# Patient Record
Sex: FEMALE | Race: WHITE | NOT HISPANIC OR LATINO | Employment: FULL TIME | URBAN - METROPOLITAN AREA
[De-identification: names, ages, dates, MRNs, and addresses within clinical notes are randomized per-mention and may not be internally consistent; named-entity substitution may affect disease eponyms.]

---

## 2020-10-13 ENCOUNTER — TRANSCRIBE ORDERS (OUTPATIENT)
Dept: ADMINISTRATIVE | Facility: HOSPITAL | Age: 60
End: 2020-10-13

## 2020-10-13 DIAGNOSIS — S83.92XA SPRAIN OF LEFT KNEE, INITIAL ENCOUNTER: Primary | ICD-10-CM

## 2020-10-23 ENCOUNTER — HOSPITAL ENCOUNTER (OUTPATIENT)
Dept: MRI IMAGING | Facility: HOSPITAL | Age: 60
Discharge: HOME | End: 2020-10-23
Payer: OTHER MISCELLANEOUS

## 2020-10-23 DIAGNOSIS — S83.92XA SPRAIN OF LEFT KNEE, INITIAL ENCOUNTER: ICD-10-CM

## 2020-10-23 PROCEDURE — G1004 CDSM NDSC: HCPCS

## 2020-10-23 PROCEDURE — 73721 MRI JNT OF LWR EXTRE W/O DYE: CPT

## 2023-06-24 ENCOUNTER — HOSPITAL ENCOUNTER (INPATIENT)
Facility: HOSPITAL | Age: 63
LOS: 5 days | Discharge: HOME HEALTH | DRG: 603 | End: 2023-06-29
Attending: EMERGENCY MEDICINE | Admitting: HOSPITALIST
Payer: COMMERCIAL

## 2023-06-24 ENCOUNTER — APPOINTMENT (EMERGENCY)
Dept: CT IMAGING | Facility: HOSPITAL | Age: 63
DRG: 603 | End: 2023-06-24
Payer: COMMERCIAL

## 2023-06-24 DIAGNOSIS — L02.31 ABSCESS OF BUTTOCK, RIGHT: ICD-10-CM

## 2023-06-24 DIAGNOSIS — L03.317 CELLULITIS OF RIGHT BUTTOCK: Primary | ICD-10-CM

## 2023-06-24 DIAGNOSIS — R59.1 LYMPHADENOPATHY: ICD-10-CM

## 2023-06-24 PROBLEM — I10 HTN (HYPERTENSION): Status: ACTIVE | Noted: 2023-06-24

## 2023-06-24 PROBLEM — E66.9 OBESITY: Status: ACTIVE | Noted: 2023-06-24

## 2023-06-24 PROBLEM — E78.5 HLD (HYPERLIPIDEMIA): Status: ACTIVE | Noted: 2023-06-24

## 2023-06-24 LAB
ALBUMIN SERPL BCP-MCNC: 3.7 G/DL (ref 3.5–5)
ALP SERPL-CCNC: 48 U/L (ref 34–104)
ALT SERPL W P-5'-P-CCNC: 11 U/L (ref 7–52)
ANION GAP SERPL CALCULATED.3IONS-SCNC: 5 MMOL/L
AST SERPL W P-5'-P-CCNC: 15 U/L (ref 13–39)
BASOPHILS # BLD AUTO: 0.04 THOUSANDS/ÂΜL (ref 0–0.1)
BASOPHILS NFR BLD AUTO: 0 % (ref 0–1)
BILIRUB SERPL-MCNC: 0.52 MG/DL (ref 0.2–1)
BUN SERPL-MCNC: 10 MG/DL (ref 5–25)
CALCIUM SERPL-MCNC: 8.9 MG/DL (ref 8.4–10.2)
CHLORIDE SERPL-SCNC: 95 MMOL/L (ref 96–108)
CO2 SERPL-SCNC: 38 MMOL/L (ref 21–32)
CREAT SERPL-MCNC: 0.81 MG/DL (ref 0.6–1.3)
EOSINOPHIL # BLD AUTO: 0.02 THOUSAND/ÂΜL (ref 0–0.61)
EOSINOPHIL NFR BLD AUTO: 0 % (ref 0–6)
ERYTHROCYTE [DISTWIDTH] IN BLOOD BY AUTOMATED COUNT: 13.4 % (ref 11.6–15.1)
GFR SERPL CREATININE-BSD FRML MDRD: 76 ML/MIN/1.73SQ M
GLUCOSE SERPL-MCNC: 103 MG/DL (ref 65–140)
HCT VFR BLD AUTO: 35.9 % (ref 34.8–46.1)
HGB BLD-MCNC: 11.6 G/DL (ref 11.5–15.4)
IMM GRANULOCYTES # BLD AUTO: 0.05 THOUSAND/UL (ref 0–0.2)
IMM GRANULOCYTES NFR BLD AUTO: 0 % (ref 0–2)
LACTATE SERPL-SCNC: 0.7 MMOL/L (ref 0.5–2)
LYMPHOCYTES # BLD AUTO: 1.89 THOUSANDS/ÂΜL (ref 0.6–4.47)
LYMPHOCYTES NFR BLD AUTO: 13 % (ref 14–44)
MAGNESIUM SERPL-MCNC: 2.2 MG/DL (ref 1.9–2.7)
MCH RBC QN AUTO: 29.3 PG (ref 26.8–34.3)
MCHC RBC AUTO-ENTMCNC: 32.3 G/DL (ref 31.4–37.4)
MCV RBC AUTO: 91 FL (ref 82–98)
MONOCYTES # BLD AUTO: 1.6 THOUSAND/ÂΜL (ref 0.17–1.22)
MONOCYTES NFR BLD AUTO: 11 % (ref 4–12)
NEUTROPHILS # BLD AUTO: 10.86 THOUSANDS/ÂΜL (ref 1.85–7.62)
NEUTS SEG NFR BLD AUTO: 76 % (ref 43–75)
NRBC BLD AUTO-RTO: 0 /100 WBCS
PLATELET # BLD AUTO: 207 THOUSANDS/UL (ref 149–390)
PMV BLD AUTO: 10.2 FL (ref 8.9–12.7)
POTASSIUM SERPL-SCNC: 2.9 MMOL/L (ref 3.5–5.3)
PROCALCITONIN SERPL-MCNC: <0.05 NG/ML
PROT SERPL-MCNC: 7.1 G/DL (ref 6.4–8.4)
RBC # BLD AUTO: 3.96 MILLION/UL (ref 3.81–5.12)
SODIUM SERPL-SCNC: 138 MMOL/L (ref 135–147)
WBC # BLD AUTO: 14.46 THOUSAND/UL (ref 4.31–10.16)

## 2023-06-24 PROCEDURE — 96365 THER/PROPH/DIAG IV INF INIT: CPT

## 2023-06-24 PROCEDURE — 99284 EMERGENCY DEPT VISIT MOD MDM: CPT

## 2023-06-24 PROCEDURE — G1004 CDSM NDSC: HCPCS

## 2023-06-24 PROCEDURE — 87147 CULTURE TYPE IMMUNOLOGIC: CPT | Performed by: PHYSICIAN ASSISTANT

## 2023-06-24 PROCEDURE — 84145 PROCALCITONIN (PCT): CPT | Performed by: PHYSICIAN ASSISTANT

## 2023-06-24 PROCEDURE — 96367 TX/PROPH/DG ADDL SEQ IV INF: CPT

## 2023-06-24 PROCEDURE — 96375 TX/PRO/DX INJ NEW DRUG ADDON: CPT

## 2023-06-24 PROCEDURE — 85025 COMPLETE CBC W/AUTO DIFF WBC: CPT | Performed by: PHYSICIAN ASSISTANT

## 2023-06-24 PROCEDURE — 87186 SC STD MICRODIL/AGAR DIL: CPT | Performed by: PHYSICIAN ASSISTANT

## 2023-06-24 PROCEDURE — 96361 HYDRATE IV INFUSION ADD-ON: CPT

## 2023-06-24 PROCEDURE — 80053 COMPREHEN METABOLIC PANEL: CPT | Performed by: PHYSICIAN ASSISTANT

## 2023-06-24 PROCEDURE — 93005 ELECTROCARDIOGRAM TRACING: CPT

## 2023-06-24 PROCEDURE — 72193 CT PELVIS W/DYE: CPT

## 2023-06-24 PROCEDURE — 87205 SMEAR GRAM STAIN: CPT | Performed by: PHYSICIAN ASSISTANT

## 2023-06-24 PROCEDURE — 87070 CULTURE OTHR SPECIMN AEROBIC: CPT | Performed by: PHYSICIAN ASSISTANT

## 2023-06-24 PROCEDURE — 96368 THER/DIAG CONCURRENT INF: CPT

## 2023-06-24 PROCEDURE — 87040 BLOOD CULTURE FOR BACTERIA: CPT | Performed by: PHYSICIAN ASSISTANT

## 2023-06-24 PROCEDURE — 83605 ASSAY OF LACTIC ACID: CPT | Performed by: PHYSICIAN ASSISTANT

## 2023-06-24 PROCEDURE — 83735 ASSAY OF MAGNESIUM: CPT | Performed by: PHYSICIAN ASSISTANT

## 2023-06-24 PROCEDURE — 99285 EMERGENCY DEPT VISIT HI MDM: CPT | Performed by: PHYSICIAN ASSISTANT

## 2023-06-24 PROCEDURE — 36415 COLL VENOUS BLD VENIPUNCTURE: CPT | Performed by: PHYSICIAN ASSISTANT

## 2023-06-24 PROCEDURE — 99255 IP/OBS CONSLTJ NEW/EST HI 80: CPT | Performed by: PHYSICIAN ASSISTANT

## 2023-06-24 PROCEDURE — 99222 1ST HOSP IP/OBS MODERATE 55: CPT | Performed by: HOSPITALIST

## 2023-06-24 RX ORDER — HYDROCHLOROTHIAZIDE 12.5 MG/1
25 CAPSULE, GELATIN COATED ORAL DAILY
COMMUNITY

## 2023-06-24 RX ORDER — HYDROCHLOROTHIAZIDE 12.5 MG/1
12.5 TABLET ORAL DAILY
Status: DISCONTINUED | OUTPATIENT
Start: 2023-06-25 | End: 2023-06-29 | Stop reason: HOSPADM

## 2023-06-24 RX ORDER — PRAVASTATIN SODIUM 20 MG
20 TABLET ORAL
Status: DISCONTINUED | OUTPATIENT
Start: 2023-06-24 | End: 2023-06-29 | Stop reason: HOSPADM

## 2023-06-24 RX ORDER — SERTRALINE HYDROCHLORIDE 25 MG/1
150 TABLET, FILM COATED ORAL DAILY
COMMUNITY

## 2023-06-24 RX ORDER — ENOXAPARIN SODIUM 100 MG/ML
40 INJECTION SUBCUTANEOUS DAILY
Status: DISCONTINUED | OUTPATIENT
Start: 2023-06-25 | End: 2023-06-29 | Stop reason: HOSPADM

## 2023-06-24 RX ORDER — ONDANSETRON 2 MG/ML
4 INJECTION INTRAMUSCULAR; INTRAVENOUS EVERY 6 HOURS PRN
Status: DISCONTINUED | OUTPATIENT
Start: 2023-06-24 | End: 2023-06-29 | Stop reason: HOSPADM

## 2023-06-24 RX ORDER — CEFEPIME HYDROCHLORIDE 2 G/50ML
2000 INJECTION, SOLUTION INTRAVENOUS EVERY 12 HOURS
Status: DISCONTINUED | OUTPATIENT
Start: 2023-06-25 | End: 2023-06-28

## 2023-06-24 RX ORDER — KETOROLAC TROMETHAMINE 30 MG/ML
15 INJECTION, SOLUTION INTRAMUSCULAR; INTRAVENOUS EVERY 6 HOURS PRN
Status: DISCONTINUED | OUTPATIENT
Start: 2023-06-24 | End: 2023-06-27

## 2023-06-24 RX ORDER — ACETAMINOPHEN 325 MG/1
650 TABLET ORAL EVERY 6 HOURS PRN
Status: DISCONTINUED | OUTPATIENT
Start: 2023-06-24 | End: 2023-06-29 | Stop reason: HOSPADM

## 2023-06-24 RX ORDER — POTASSIUM CHLORIDE 20 MEQ/1
40 TABLET, EXTENDED RELEASE ORAL ONCE
Status: COMPLETED | OUTPATIENT
Start: 2023-06-24 | End: 2023-06-24

## 2023-06-24 RX ORDER — SENNOSIDES 8.6 MG
1 TABLET ORAL DAILY
Status: DISCONTINUED | OUTPATIENT
Start: 2023-06-25 | End: 2023-06-29 | Stop reason: HOSPADM

## 2023-06-24 RX ORDER — ASPIRIN 81 MG/1
81 TABLET, CHEWABLE ORAL DAILY
Status: DISCONTINUED | OUTPATIENT
Start: 2023-06-25 | End: 2023-06-29 | Stop reason: HOSPADM

## 2023-06-24 RX ORDER — MAGNESIUM SULFATE 1 G/100ML
1 INJECTION INTRAVENOUS ONCE
Status: COMPLETED | OUTPATIENT
Start: 2023-06-24 | End: 2023-06-24

## 2023-06-24 RX ORDER — SIMVASTATIN 20 MG
20 TABLET ORAL
COMMUNITY

## 2023-06-24 RX ORDER — POTASSIUM CHLORIDE 14.9 MG/ML
20 INJECTION INTRAVENOUS ONCE
Status: COMPLETED | OUTPATIENT
Start: 2023-06-24 | End: 2023-06-24

## 2023-06-24 RX ORDER — DOCUSATE SODIUM 100 MG/1
100 CAPSULE, LIQUID FILLED ORAL 2 TIMES DAILY
Status: DISCONTINUED | OUTPATIENT
Start: 2023-06-24 | End: 2023-06-29 | Stop reason: HOSPADM

## 2023-06-24 RX ORDER — KETOROLAC TROMETHAMINE 30 MG/ML
15 INJECTION, SOLUTION INTRAMUSCULAR; INTRAVENOUS ONCE
Status: COMPLETED | OUTPATIENT
Start: 2023-06-24 | End: 2023-06-24

## 2023-06-24 RX ORDER — POLYETHYLENE GLYCOL 3350 17 G/17G
17 POWDER, FOR SOLUTION ORAL DAILY
Status: DISCONTINUED | OUTPATIENT
Start: 2023-06-25 | End: 2023-06-29 | Stop reason: HOSPADM

## 2023-06-24 RX ORDER — ASPIRIN 81 MG/1
81 TABLET, CHEWABLE ORAL DAILY
COMMUNITY

## 2023-06-24 RX ORDER — CEFEPIME HYDROCHLORIDE 2 G/50ML
2000 INJECTION, SOLUTION INTRAVENOUS ONCE
Status: COMPLETED | OUTPATIENT
Start: 2023-06-24 | End: 2023-06-24

## 2023-06-24 RX ADMIN — MAGNESIUM SULFATE IN DEXTROSE 1 G: 10 INJECTION, SOLUTION INTRAVENOUS at 13:24

## 2023-06-24 RX ADMIN — SODIUM CHLORIDE 1000 ML: 0.9 INJECTION, SOLUTION INTRAVENOUS at 12:06

## 2023-06-24 RX ADMIN — POTASSIUM CHLORIDE 40 MEQ: 1500 TABLET, EXTENDED RELEASE ORAL at 20:42

## 2023-06-24 RX ADMIN — KETOROLAC TROMETHAMINE 15 MG: 30 INJECTION, SOLUTION INTRAMUSCULAR; INTRAVENOUS at 18:15

## 2023-06-24 RX ADMIN — PRAVASTATIN SODIUM 20 MG: 20 TABLET ORAL at 18:16

## 2023-06-24 RX ADMIN — POTASSIUM CHLORIDE 40 MEQ: 1500 TABLET, EXTENDED RELEASE ORAL at 13:33

## 2023-06-24 RX ADMIN — CEFEPIME HYDROCHLORIDE 2000 MG: 2 INJECTION, SOLUTION INTRAVENOUS at 12:50

## 2023-06-24 RX ADMIN — VANCOMYCIN HYDROCHLORIDE 1250 MG: 5 INJECTION, POWDER, LYOPHILIZED, FOR SOLUTION INTRAVENOUS at 14:12

## 2023-06-24 RX ADMIN — DOCUSATE SODIUM 100 MG: 100 CAPSULE, LIQUID FILLED ORAL at 18:16

## 2023-06-24 RX ADMIN — POTASSIUM CHLORIDE 20 MEQ: 14.9 INJECTION, SOLUTION INTRAVENOUS at 13:24

## 2023-06-24 RX ADMIN — CEFEPIME HYDROCHLORIDE 2000 MG: 2 INJECTION, SOLUTION INTRAVENOUS at 23:04

## 2023-06-24 RX ADMIN — KETOROLAC TROMETHAMINE 15 MG: 30 INJECTION, SOLUTION INTRAMUSCULAR; INTRAVENOUS at 12:07

## 2023-06-24 RX ADMIN — IOHEXOL 100 ML: 350 INJECTION, SOLUTION INTRAVENOUS at 13:02

## 2023-06-24 NOTE — CONSULTS
Consultation - Raymond Giles 57 y.o. female MRN: [id number]    Unit/Bed#: ED 01 Encounter: [id number]      Assessment/Plan     Assessment/Plan:  Right buttock wound and cellulitis  -reports pain and swelling that began Tuesday, drainage began on Wednesday  -leukocytosis noted 14K, trend  -CT: 1. Skin thickening and infiltration in the right inferomedial buttock region. No abscess. 2.  Enlarged lymph nodes in the right lower quadrant mesentery of uncertain significance. Recommend follow-up CT abdomen pelvis in 1-2 months to assess for additional lymphadenopathy and change. Right pelvic lymph nodes as noted above probably reactive  -reviewed CT with pt, will need repeat in 1-2 months to eval lymph node findings  -will order a non-stick dressing with ABD to protect wound  -may have spontaneously drained prior to ER visit vs skin threatening from pressure  -recommend conservative treatment with IV abx and pain control   -no I&D warranted, suspect this may have been traumatic  -discussed with Dr. Navya Key             History of Present Illness     HPI: Raymond Giles is a 57y.o. year old female with pmh for HTN, HLD who presents to the emergency room with complaints of right buttock pain and swelling. She states that her symptoms began on Tuesday and then she noticed drainage that began on Wednesday. She also reports that she was having chills alternating with sweats. She states that food does not taste right. She denies any trauma to the area however, her  reports that she has a large bruise on the left hip. She states that prior to Tuesday, she was feeling well. She states that she has noticed loose bowel movements since Tuesday as well. No blood in her stool. Her last colonoscopy was approximately 2 years ago of which was normal.  She has never had anything like this before.     Inpatient consult to Acute Care Surgery  Consult performed by: Dary Zimmer PA-C  Consult ordered by: Zak Deleon Andi Simon MD          Review of Systems   Constitutional: Negative for chills and fever. HENT: Negative for ear pain and sore throat. Eyes: Negative for pain and visual disturbance. Respiratory: Negative for cough and shortness of breath. Cardiovascular: Negative for chest pain and palpitations. Gastrointestinal: Negative for abdominal pain, blood in stool, constipation, nausea and vomiting. Reports loose stools. Genitourinary: Negative for dysuria and hematuria. Musculoskeletal: Negative for arthralgias and back pain. Skin: Negative for color change and rash. She reports pain and swelling of the right buttock as well as drainage. Neurological: Negative for seizures and syncope. All other systems reviewed and are negative. Historical Information   History reviewed. No pertinent past medical history. Past Surgical History:   Procedure Laterality Date   • HYSTERECTOMY     • TONSILLECTOMY     • TOTAL KNEE ARTHROPLASTY       Social History   Social History     Substance and Sexual Activity   Alcohol Use Not Currently     Social History     Substance and Sexual Activity   Drug Use Not on file     Social History     Tobacco Use   Smoking Status Never   • Passive exposure: Never   Smokeless Tobacco Never     E-Cigarette/Vaping     E-Cigarette/Vaping Substances      Family History: non-contributory    Meds/Allergies   all current active meds have been reviewed  Allergies   Allergen Reactions   • Sulfa Antibiotics Other (See Comments)     Sensitivity"       Objective   Vitals: Blood pressure 136/62, pulse 87, temperature 99.1 °F (37.3 °C), resp. rate 20, height 5' 2" (1.575 m), weight 77.1 kg (170 lb), SpO2 91 %.     Intake/Output Summary (Last 24 hours) at 6/24/2023 1846  Last data filed at 6/24/2023 1524  Gross per 24 hour   Intake 1250 ml   Output --   Net 1250 ml     Invasive Devices     Peripheral Intravenous Line  Duration           Peripheral IV 06/24/23 Left Antecubital <1 day Peripheral IV 06/24/23 Right Antecubital <1 day                Physical Exam  Vitals and nursing note reviewed. Constitutional:       General: She is not in acute distress. Appearance: She is well-developed. HENT:      Head: Normocephalic and atraumatic. Eyes:      Conjunctiva/sclera: Conjunctivae normal.   Cardiovascular:      Rate and Rhythm: Normal rate and regular rhythm. Heart sounds: No murmur heard. Pulmonary:      Effort: Pulmonary effort is normal. No respiratory distress. Breath sounds: Normal breath sounds. Abdominal:      General: Bowel sounds are normal. There is no distension. Palpations: Abdomen is soft. Tenderness: There is no abdominal tenderness. There is no guarding. Musculoskeletal:         General: No swelling. Cervical back: Neck supple. Skin:     General: Skin is warm and dry. Capillary Refill: Capillary refill takes less than 2 seconds. Comments: Left hip with large area of ecchymosis noted. Right buttock, with a large area of erythema and a centralized wound which may have been an abscess that spontaneously drained. Presently, it looks like an eschar or abrasion. The surrounding area is very tender and indurated without any area of fluctuance. Please see photo in media. Neurological:      General: No focal deficit present. Mental Status: She is alert and oriented to person, place, and time. Psychiatric:         Mood and Affect: Mood normal.         Lab Results: I have personally reviewed pertinent reports. Imaging Studies: I have personally reviewed pertinent reports. EKG, Pathology, and Other Studies: I have personally reviewed pertinent films in PACS  VTE Prophylaxis: Enoxaparin (Lovenox)    Code Status: Level 1 - Full Code  Advance Directive and Living Will:      Power of :    POLST:      Counseling / Coordination of Care  Total floor / unit time spent today 30 minutes.  Greater than 50% of total time was spent with the patient and / or family counseling and / or coordination of care.

## 2023-06-24 NOTE — PROGRESS NOTES
Linda Nash is a 57 y.o. female who is currently ordered Vancomycin IV with management by the Pharmacy Consult service. Relevant clinical data and objective / subjective history reviewed. Vancomycin Assessment:  Indication and Goal AUC/Trough: Soft tissue (goal -600, trough >10)  Clinical Status:  initial dosing  Micro:     Renal Function:  SCr: 0.81 mg/dL  CrCl: 67.5 mL/min  Renal replacement: Not on dialysis  Days of Therapy: 1  Current Dose: 1250 mg once   Vancomycin Plan:  New Dosin mg Q 12 H   Estimated AUC: 430 mcg*hr/mL  Estimated Trough: 14 mcg/mL  Next Level: 0530 on 23  Renal Function Monitoring: Daily BMP and UOP  Pharmacy will continue to follow closely for s/sx of nephrotoxicity, infusion reactions and appropriateness of therapy. BMP and CBC will be ordered per protocol. We will continue to follow the patient’s culture results and clinical progress daily.     Marcelle Runner, Pharmacist

## 2023-06-24 NOTE — ED PROVIDER NOTES
History  Chief Complaint   Patient presents with   • Abscess     Pt to ED from urgent care for cody rectal abscess on butt. Pt states it is hot and painful. Started on Tuesday and has just gotten bigger, started the size of a quarter and now about the size of palm. Patient is a 56 y/o F with h/o hyperlipidemia that presents to the ED with abscess to right buttocks that started 5 days ago and has worsened. She states she has had fevers, chills. No nausea or vomiting. The abscess has been draining. She went to urgent care and was told to go to the ED. No history of prior abscess. No h/o MRSA. History provided by:  Patient  Abscess  Location:  Pelvis  Pelvic abscess location:  R buttock  Size:  3cm  Abscess quality: draining, induration, painful, redness and warmth    Abscess quality: no fluctuance    Duration:  5 days  Progression:  Worsening  Pain details:     Quality:  Aching    Severity:  Moderate    Duration:  5 days    Timing:  Constant    Progression:  Worsening  Chronicity:  New  Context: not diabetes, not injected drug use, not insect bite/sting and not skin injury    Relieved by:  Nothing  Worsened by:  Nothing  Ineffective treatments:  None tried  Associated symptoms: fatigue and fever    Associated symptoms: no nausea and no vomiting    Risk factors: no hx of MRSA and no prior abscess        Prior to Admission Medications   Prescriptions Last Dose Informant Patient Reported? Taking?   aspirin 81 mg chewable tablet   Yes Yes   Sig: Chew 81 mg daily   hydrochlorothiazide (MICROZIDE) 12.5 mg capsule   Yes Yes   Sig: Take 25 mg by mouth daily   sertraline (ZOLOFT) 25 mg tablet   Yes Yes   Sig: Take 25 mg by mouth daily   simvastatin (ZOCOR) 20 mg tablet   Yes Yes   Sig: Take 20 mg by mouth daily at bedtime      Facility-Administered Medications: None       History reviewed. No pertinent past medical history.     Past Surgical History:   Procedure Laterality Date   • HYSTERECTOMY     • TONSILLECTOMY     • TOTAL KNEE ARTHROPLASTY         History reviewed. No pertinent family history. I have reviewed and agree with the history as documented. E-Cigarette/Vaping     E-Cigarette/Vaping Substances     Social History     Tobacco Use   • Smoking status: Never     Passive exposure: Never   • Smokeless tobacco: Never   Substance Use Topics   • Alcohol use: Not Currently       Review of Systems   Constitutional: Positive for chills, fatigue and fever. Gastrointestinal: Negative for nausea and vomiting. Skin: Positive for color change. Neurological: Negative for dizziness, weakness and numbness. Psychiatric/Behavioral: Negative for confusion. All other systems reviewed and are negative. Physical Exam  Physical Exam  Vitals and nursing note reviewed. Constitutional:       General: She is not in acute distress. Appearance: Normal appearance. She is well-developed, well-groomed and overweight. She is not ill-appearing or diaphoretic. HENT:      Head: Normocephalic and atraumatic. Right Ear: External ear normal.      Left Ear: External ear normal.      Nose: Nose normal.   Eyes:      Conjunctiva/sclera: Conjunctivae normal.      Pupils: Pupils are equal.   Cardiovascular:      Rate and Rhythm: Regular rhythm. Tachycardia present. Heart sounds: Normal heart sounds. Pulmonary:      Effort: Pulmonary effort is normal.      Breath sounds: Normal breath sounds. No wheezing, rhonchi or rales. Musculoskeletal:         General: Normal range of motion. Cervical back: Normal range of motion. Right lower leg: No edema. Left lower leg: No edema. Skin:     General: Skin is warm and dry. Findings: Erythema present. Comments: Abscess to right buttocks with significant surrounding erythema. Abscess TTP, indurated, no fluctuance. Neurological:      Mental Status: She is alert and oriented to person, place, and time. Sensory: Sensation is intact. Motor: Motor function is intact. Gait: Gait is intact. Psychiatric:         Mood and Affect: Mood normal.         Behavior: Behavior is cooperative.              Vital Signs  ED Triage Vitals   Temperature Pulse Respirations Blood Pressure SpO2   06/24/23 1123 06/24/23 1123 06/24/23 1123 06/24/23 1123 06/24/23 1123   99.1 °F (37.3 °C) 90 18 149/75 99 %      Temp src Heart Rate Source Patient Position - Orthostatic VS BP Location FiO2 (%)   -- 06/24/23 1419 06/24/23 1123 06/24/23 1123 --    Monitor Sitting Left arm       Pain Score       06/24/23 1123       10 - Worst Possible Pain           Vitals:    06/24/23 1123 06/24/23 1419   BP: 149/75 128/70   Pulse: 90 77   Patient Position - Orthostatic VS: Sitting Lying         Visual Acuity      ED Medications  Medications   potassium chloride 20 mEq IVPB (premix) (20 mEq Intravenous New Bag 6/24/23 1324)   magnesium sulfate IVPB (premix) SOLN 1 g (1 g Intravenous New Bag 6/24/23 1324)   vancomycin (VANCOCIN) 1250 mg in sodium chloride 0.9% 250 mL IVPB (1,250 mg Intravenous New Bag 6/24/23 1412)   sodium chloride 0.9 % bolus 1,000 mL (1,000 mL Intravenous New Bag 6/24/23 1206)   ketorolac (TORADOL) injection 15 mg (15 mg Intravenous Given 6/24/23 1207)   potassium chloride (K-DUR,KLOR-CON) CR tablet 40 mEq (40 mEq Oral Given 6/24/23 1333)   cefepime (MAXIPIME) IVPB (premix in dextrose) 2,000 mg 50 mL (0 mg Intravenous Stopped 6/24/23 1334)   iohexol (OMNIPAQUE) 350 MG/ML injection (SINGLE-DOSE) 100 mL (100 mL Intravenous Given 6/24/23 1302)       Diagnostic Studies  Results Reviewed     Procedure Component Value Units Date/Time    Magnesium [[id number]]  (Normal) Collected: 06/24/23 1200    Lab Status: Final result Specimen: Blood from Arm, Left Updated: 06/24/23 1309     Magnesium 2.2 mg/dL     Procalcitonin [[id number]]  (Normal) Collected: 06/24/23 1200    Lab Status: Final result Specimen: Blood from Arm, Left Updated: 06/24/23 1242     Procalcitonin <0.05 ng/ml Comprehensive metabolic panel [[id number]]  (Abnormal) Collected: 06/24/23 1200    Lab Status: Final result Specimen: Blood from Arm, Left Updated: 06/24/23 1232     Sodium 138 mmol/L      Potassium 2.9 mmol/L      Chloride 95 mmol/L      CO2 38 mmol/L      ANION GAP 5 mmol/L      BUN 10 mg/dL      Creatinine 0.81 mg/dL      Glucose 103 mg/dL      Calcium 8.9 mg/dL      AST 15 U/L      ALT 11 U/L      Alkaline Phosphatase 48 U/L      Total Protein 7.1 g/dL      Albumin 3.7 g/dL      Total Bilirubin 0.52 mg/dL      eGFR 76 ml/min/1.73sq m     Narrative:      [place] guidelines for Chronic Kidney Disease (CKD):   •  Stage 1 with normal or high GFR (GFR > 90 mL/min/1.73 square meters)  •  Stage 2 Mild CKD (GFR = 60-89 mL/min/1.73 square meters)  •  Stage 3A Moderate CKD (GFR = 45-59 mL/min/1.73 square meters)  •  Stage 3B Moderate CKD (GFR = 30-44 mL/min/1.73 square meters)  •  Stage 4 Severe CKD (GFR = 15-29 mL/min/1.73 square meters)  •  Stage 5 End Stage CKD (GFR <15 mL/min/1.73 square meters)  Note: GFR calculation is accurate only with a steady state creatinine    Lactic acid, plasma (w/reflex if result > 2.0) [[id number]]  (Normal) Collected: 06/24/23 1200    Lab Status: Final result Specimen: Blood from Arm, Left Updated: 06/24/23 1231     LACTIC ACID 0.7 mmol/L     Narrative:      Result may be elevated if tourniquet was used during collection. Wound culture and Gram stain [[id number]] Collected: 06/24/23 1210    Lab Status:  In process Specimen: Wound from Buttock Updated: 06/24/23 1217    CBC and differential [[id number]]  (Abnormal) Collected: 06/24/23 1200    Lab Status: Final result Specimen: Blood from Arm, Left Updated: 06/24/23 1214     WBC 14.46 Thousand/uL      RBC 3.96 Million/uL      Hemoglobin 11.6 g/dL      Hematocrit 35.9 %      MCV 91 fL      MCH 29.3 pg      MCHC 32.3 g/dL      RDW 13.4 %      MPV 10.2 fL      Platelets 613 Thousands/uL      nRBC 0 /100 WBCs Neutrophils Relative 76 %      Immat GRANS % 0 %      Lymphocytes Relative 13 %      Monocytes Relative 11 %      Eosinophils Relative 0 %      Basophils Relative 0 %      Neutrophils Absolute 10.86 Thousands/µL      Immature Grans Absolute 0.05 Thousand/uL      Lymphocytes Absolute 1.89 Thousands/µL      Monocytes Absolute 1.60 Thousand/µL      Eosinophils Absolute 0.02 Thousand/µL      Basophils Absolute 0.04 Thousands/µL     Blood culture #2 [[id number]] Collected: 06/24/23 1200    Lab Status: In process Specimen: Blood from Arm, Right Updated: 06/24/23 1210    Blood culture #1 [[id number]] Collected: 06/24/23 1200    Lab Status: In process Specimen: Blood from Arm, Left Updated: 06/24/23 1210                 CT pelvis w contrast   Final Result by Leslie Buenrostro MD (06/24 7201)      1. Skin thickening and infiltration in the right inferomedial buttock region. No abscess. 2.  Enlarged lymph nodes in the right lower quadrant mesentery of uncertain significance. Recommend follow-up CT abdomen pelvis in 1-2 months to assess for additional lymphadenopathy and change. Right pelvic lymph nodes as noted above probably reactive. Workstation performed: [id number]                    Procedures  ECG 12 Lead Documentation Only    Date/Time: 6/24/2023 1:33 PM    Performed by: Sarah Beth Paez PA-C  Authorized by: Sarah Beth Paez PA-C    Indications / Diagnosis:  Hypokalemia  Patient location:  ED  Previous ECG:     Previous ECG:  Unavailable  Rate:     ECG rate:  82  Rhythm:     Rhythm: sinus rhythm    Conduction:     Conduction: normal    ST segments:     ST segments: flattening of ST segments in V5, V6. T waves:     T waves: normal               ED Course  ED Course as of 06/24/23 1422   Sat Jun 24, 2023   1400 SLIM paged for admission. SBIRT 20yo+    Flowsheet Row Most Recent Value   Initial Alcohol Screen: US AUDIT-C     1.  How often do you have a drink containing alcohol? 0 Filed at: 06/24/2023 1129   2. How many drinks containing alcohol do you have on a typical day you are drinking? 0 Filed at: 06/24/2023 1129   3a. Male UNDER 65: How often do you have five or more drinks on one occasion? 0 Filed at: 06/24/2023 1129   3b. FEMALE Any Age, or MALE 65+: How often do you have 4 or more drinks on one occassion? 0 Filed at: 06/24/2023 1129   Audit-C Score 0 Filed at: 06/24/2023 1129   MIS: How many times in the past year have you. .. Used an illegal drug or used a prescription medication for non-medical reasons? Never Filed at: 06/24/2023 1129                    Medical Decision Making  Patient with cellulitis to buttocks, fevers, will order labs, CT scan to further evaluate extent of infection. Will admit for IV abx given extent of infection. Patient with hypokalemia, will give IV and oral potassium and admit for further treatment and monitoring. Cellulitis of right buttock: acute illness or injury  Lymphadenopathy: acute illness or injury  Amount and/or Complexity of Data Reviewed  Labs: ordered. Radiology: ordered. ECG/medicine tests: ordered and independent interpretation performed. Risk  Prescription drug management. Decision regarding hospitalization. Disposition  Final diagnoses:   Cellulitis of right buttock   Lymphadenopathy     Time reflects when diagnosis was documented in both MDM as applicable and the Disposition within this note     Time User Action Codes Description Comment    6/24/2023  2:05 PM Jessica Martinez Add [L03.317] Cellulitis of right buttock     6/24/2023  2:06 PM Jessica Martinez Add [R59.1] Lymphadenopathy       ED Disposition     ED Disposition   Admit    Condition   Stable    Date/Time   Sat Jun 24, 2023  2:06 PM    Comment   Case was discussed with Dr. Stevan Briceno and the patient's admission status was agreed to be Admission Status: inpatient status to the service of Dr. Stevan Briceno .            Follow-up Information None         Patient's Medications   Discharge Prescriptions    No medications on file       No discharge procedures on file.     PDMP Review     None          ED Provider  Electronically Signed by           Shahram Long PA-C  06/24/23 2343

## 2023-06-24 NOTE — H&P
[address]  H&P  Name: Jean-Paul Sosa 57 y.o. female I MRN: [id number]  Unit/Bed#: ED 01 I Date of Admission: 6/24/2023   Date of Service: 6/24/2023 I Hospital Day: 0      Assessment/Plan   HLD (hyperlipidemia)  Assessment & Plan  Cont zocor. HTN (hypertension)  Assessment & Plan  Cont microzide. Obesity  Assessment & Plan  Contributes to all aspects of care  Weight loss encouraged    * Cellulitis of buttock, right  Assessment & Plan  Cont vanc, cefepime  Await blood cultures  Await wound cultures  Will ask surgery to evaluate for bedside I+D  Pain control            Chief Complaint   Patient presents with   • Abscess     Pt to ED from urgent care for cody rectal abscess on butt. Pt states it is hot and painful. Started on Tuesday and has just gotten bigger, started the size of a quarter and now about the size of palm. HPI:  Jean-Paul Sosa is a 57 y.o. female who presents with perirectal abscess. Patient was seen at urgent care and referred here. This started acutely on Tuesday. She has had fevers and chills at home. She has had significant pain to the point that she could not walk. She says the affected area is increasing in size. Notes a area of firmness which concerned her and why she presented for further evaluation. Patient has no chest pain. No abdominal pain. No dysuria. Feels better after getting Toradol in the ER  Historical Information     PMH: HLD, HTN.   Past Surgical History:   Procedure Laterality Date   • HYSTERECTOMY     • TONSILLECTOMY     • TOTAL KNEE ARTHROPLASTY       Social History   Social History     Substance and Sexual Activity   Alcohol Use Not Currently     Social History     Substance and Sexual Activity   Drug Use Not on file     Social History     Tobacco Use   Smoking Status Never   • Passive exposure: Never   Smokeless Tobacco Never    no family history of r buttock cellulitis  Meds/Allergies   Allergies   Allergen Reactions   • Sulfa Antibiotics Other (See Comments)     Sensitivity"       Meds:    Current Facility-Administered Medications:   •  potassium chloride 20 mEq IVPB (premix), 20 mEq, Intravenous, Once, Scott Guzman PA-C, Last Rate: 50 mL/hr at 06/24/23 1324, 20 mEq at 06/24/23 1324  •  vancomycin (VANCOCIN) 1250 mg in sodium chloride 0.9% 250 mL IVPB, 20 mg/kg (Adjusted), Intravenous, Once, Scott Guzman PA-C, Last Rate: 166.7 mL/hr at 06/24/23 1412, 1,250 mg at 06/24/23 1412    Current Outpatient Medications:   •  aspirin 81 mg chewable tablet, Chew 81 mg daily, Disp: , Rfl:   •  hydrochlorothiazide (MICROZIDE) 12.5 mg capsule, Take 25 mg by mouth daily, Disp: , Rfl:   •  sertraline (ZOLOFT) 25 mg tablet, Take 25 mg by mouth daily, Disp: , Rfl:   •  simvastatin (ZOCOR) 20 mg tablet, Take 20 mg by mouth daily at bedtime, Disp: , Rfl:     (Not in a hospital admission)        Review of Systems:    A complete and comprehensive 14 point organ system review was performed and all other systems are negative other than stated above in the HPI    Current Vitals:   Blood Pressure: 128/70 (06/24/23 1419)  Pulse: 77 (06/24/23 1419)  Temperature: 99.1 °F (37.3 °C) (06/24/23 1123)  Respirations: 20 (06/24/23 1419)  Height: 5' 2" (157.5 cm) (06/24/23 1255)  Weight - Scale: 77.1 kg (170 lb) (06/24/23 1123)  SpO2: 93 % (06/24/23 1419)  SPO2 RA Rest    Flowsheet Row ED from 6/24/2023 in  [facility] Emergency Department   SpO2 93 %   SpO2 Activity At Rest   O2 Device None (Room air)   O2 Flow Rate --          Intake/Output Summary (Last 24 hours) at 6/24/2023 1456  Last data filed at 6/24/2023 1334  Gross per 24 hour   Intake 50 ml   Output --   Net 50 ml     Body mass index is 31.09 kg/m².      Physical Exam:     General: well appearing, no acute distress  HEENT: atraumatic, PERRLA, moist mucosa, normal pharynx, normal tonsils and adenoids, normal tongue, no fluid in sinuses  Neck: Trachea midline, no carotid bruit, no masses  Respiratory: normal chest wall expansion, CTA B, no r/r/w, no rubs  Cardiovascular: RRR, no m/r/g, Normal S1 and S2  Abdomen: Soft, non-tender, non-distended, normal bowel sounds in all quadrants, no hepatosplenomegaly, no tympany. Rectal: deferred  Musculoskeletal: normal ROM in upper and lower extremities  Integumentary: r buttock cellulitis  Heme/Lymph: no lymphadenopathy, no bruises  Neurological: Cranial Nerves II-XII grossly intact; no focal deficits in sensation or strength, A  x O x 3  Psychiatric: cooperative with normal mood, affect, and cognition    Lab Results:   CBC:   Lab Results   Component Value Date    WBC 14.46 (H) 06/24/2023    HGB 11.6 06/24/2023    HCT 35.9 06/24/2023    MCV 91 06/24/2023     06/24/2023    RBC 3.96 06/24/2023    MCH 29.3 06/24/2023    MCHC 32.3 06/24/2023    RDW 13.4 06/24/2023    MPV 10.2 06/24/2023    NRBC 0 06/24/2023     CMP:  Lab Results   Component Value Date    CL 95 (L) 06/24/2023    CO2 38 (H) 06/24/2023    BUN 10 06/24/2023    CREATININE 0.81 06/24/2023    CALCIUM 8.9 06/24/2023    AST 15 06/24/2023    ALT 11 06/24/2023    ALKPHOS 48 06/24/2023    EGFR 76 06/24/2023     No results found for: "TROPONINI", "CKMB", "CKTOTAL"  Coagulation: No results found for: "PT", "INR", "APTT" Urinalysis:No results found for: "COLORU", "CLARITYU", "SPECGRAV", "PHUR", "LEUKOCYTESUR", "NITRITE", "PROTEINUA", "GLUCOSEU", "Maria Eugenia Matthew", "Vanda Dahlia", "BLOODU"   Amylase: No results found for: "AMYLASE"  Lipase: No results found for: "LIPASE"     Imaging: CT pelvis w contrast    Result Date: 6/24/2023  Narrative: CT PELVIS WITH IV CONTRAST INDICATION:   abscess. COMPARISON:  None. TECHNIQUE: CT examination of the pelvis was performed. Multiplanar 2D reformatted images were created from the source data.  This examination, like all CT scans performed in the [facility], was performed utilizing techniques to minimize radiation dose exposure, including the use of iterative reconstruction and automated exposure control. Radiation dose length product (DLP) for this visit:  705.79 mGy-cm . IV Contrast:  100 mL of iohexol (OMNIPAQUE) Enteric Contrast:  Enteric contrast was not administered. FINDINGS: VISUALIZED KIDNEYS/URETERS: Subcentimeter hypodensity at the right kidney lower pole likely tiny cyst. REPRODUCTIVE ORGANS:  Patient is status post hysterectomy. URINARY BLADDER:  Unremarkable. APPENDIX:  No findings to suggest appendicitis. VISUALIZED BOWEL:  There is colonic diverticulosis without evidence of acute diverticulitis. ABDOMINOPELVIC CAVITY:  No ascites or free intraperitoneal air. Enlarged lymph nodes in the right lower quadrant mesentery. A lymph node here measures 2.0 x 1.2 cm image 13 series 302. Enlarged right external iliac chain lymph node measuring 2.5 x 1.3 cm image 57 series 302 probably reactive. VISUALIZED VESSELS:  Unremarkable for patient's age. ABDOMINOPELVIC WALL/INGUINAL REGIONS: Skin thickening and infiltration in the right inferomedial buttock region. No abscess. No perianal nor perirectal collections. Enhancing elongated right groin lymph node measuring 8 mm short axis probably reactive. Small fat-containing umbilical hernia. OSSEOUS STRUCTURES:  No acute fracture or destructive osseous lesion. Impression: 1. Skin thickening and infiltration in the right inferomedial buttock region. No abscess. 2.  Enlarged lymph nodes in the right lower quadrant mesentery of uncertain significance. Recommend follow-up CT abdomen pelvis in 1-2 months to assess for additional lymphadenopathy and change. Right pelvic lymph nodes as noted above probably reactive. Workstation performed: [id number]     EKG, Pathology, and Other Studies: I have personally reviewed the results.   VTE   Prophylaxis: In place    Code Status: No Order    Anticipated Length of Stay:  Patient will be admitted on an Inpatient basis with an anticipated length of stay of  Greater 2 midnights. Counseling / Coordination of Care  Total floor / unit time spent today 45 minutes. Greater than 50% of total time was spent with the patient and / or family counseling and / or coordination of care.      "This note has been constructed using a voice recognition system"      Nba Mendoza MD  6/24/2023, 2:56 PM

## 2023-06-24 NOTE — ASSESSMENT & PLAN NOTE
Cont vanc, cefepime  Await blood cultures  Await wound cultures  Will ask surgery to evaluate for bedside I+D  Pain control

## 2023-06-25 LAB
ALBUMIN SERPL BCP-MCNC: 3.3 G/DL (ref 3.5–5)
ALP SERPL-CCNC: 42 U/L (ref 34–104)
ALT SERPL W P-5'-P-CCNC: 10 U/L (ref 7–52)
ANION GAP SERPL CALCULATED.3IONS-SCNC: 6 MMOL/L
AST SERPL W P-5'-P-CCNC: 13 U/L (ref 13–39)
ATRIAL RATE: 82 BPM
BASOPHILS # BLD AUTO: 0.03 THOUSANDS/ÂΜL (ref 0–0.1)
BASOPHILS NFR BLD AUTO: 0 % (ref 0–1)
BILIRUB SERPL-MCNC: 0.42 MG/DL (ref 0.2–1)
BUN SERPL-MCNC: 11 MG/DL (ref 5–25)
CALCIUM ALBUM COR SERPL-MCNC: 8.6 MG/DL (ref 8.3–10.1)
CALCIUM SERPL-MCNC: 8 MG/DL (ref 8.4–10.2)
CHLORIDE SERPL-SCNC: 101 MMOL/L (ref 96–108)
CO2 SERPL-SCNC: 32 MMOL/L (ref 21–32)
CREAT SERPL-MCNC: 0.82 MG/DL (ref 0.6–1.3)
EOSINOPHIL # BLD AUTO: 0.09 THOUSAND/ÂΜL (ref 0–0.61)
EOSINOPHIL NFR BLD AUTO: 1 % (ref 0–6)
ERYTHROCYTE [DISTWIDTH] IN BLOOD BY AUTOMATED COUNT: 13.4 % (ref 11.6–15.1)
GFR SERPL CREATININE-BSD FRML MDRD: 75 ML/MIN/1.73SQ M
GLUCOSE SERPL-MCNC: 102 MG/DL (ref 65–140)
HCT VFR BLD AUTO: 33.6 % (ref 34.8–46.1)
HGB BLD-MCNC: 10.8 G/DL (ref 11.5–15.4)
IMM GRANULOCYTES # BLD AUTO: 0.06 THOUSAND/UL (ref 0–0.2)
IMM GRANULOCYTES NFR BLD AUTO: 1 % (ref 0–2)
LYMPHOCYTES # BLD AUTO: 1.7 THOUSANDS/ÂΜL (ref 0.6–4.47)
LYMPHOCYTES NFR BLD AUTO: 14 % (ref 14–44)
MCH RBC QN AUTO: 29.3 PG (ref 26.8–34.3)
MCHC RBC AUTO-ENTMCNC: 32.1 G/DL (ref 31.4–37.4)
MCV RBC AUTO: 91 FL (ref 82–98)
MONOCYTES # BLD AUTO: 1.38 THOUSAND/ÂΜL (ref 0.17–1.22)
MONOCYTES NFR BLD AUTO: 12 % (ref 4–12)
NEUTROPHILS # BLD AUTO: 8.57 THOUSANDS/ÂΜL (ref 1.85–7.62)
NEUTS SEG NFR BLD AUTO: 72 % (ref 43–75)
NRBC BLD AUTO-RTO: 0 /100 WBCS
P AXIS: 33 DEGREES
PLATELET # BLD AUTO: 200 THOUSANDS/UL (ref 149–390)
PMV BLD AUTO: 10.2 FL (ref 8.9–12.7)
POTASSIUM SERPL-SCNC: 3.4 MMOL/L (ref 3.5–5.3)
PR INTERVAL: 156 MS
PROT SERPL-MCNC: 6.2 G/DL (ref 6.4–8.4)
QRS AXIS: 49 DEGREES
QRSD INTERVAL: 86 MS
QT INTERVAL: 380 MS
QTC INTERVAL: 443 MS
RBC # BLD AUTO: 3.68 MILLION/UL (ref 3.81–5.12)
SODIUM SERPL-SCNC: 139 MMOL/L (ref 135–147)
T WAVE AXIS: 33 DEGREES
VENTRICULAR RATE: 82 BPM
WBC # BLD AUTO: 11.83 THOUSAND/UL (ref 4.31–10.16)

## 2023-06-25 PROCEDURE — 99232 SBSQ HOSP IP/OBS MODERATE 35: CPT | Performed by: HOSPITALIST

## 2023-06-25 PROCEDURE — 93010 ELECTROCARDIOGRAM REPORT: CPT | Performed by: INTERNAL MEDICINE

## 2023-06-25 PROCEDURE — 85025 COMPLETE CBC W/AUTO DIFF WBC: CPT | Performed by: HOSPITALIST

## 2023-06-25 PROCEDURE — 99232 SBSQ HOSP IP/OBS MODERATE 35: CPT | Performed by: PHYSICIAN ASSISTANT

## 2023-06-25 PROCEDURE — 80053 COMPREHEN METABOLIC PANEL: CPT | Performed by: HOSPITALIST

## 2023-06-25 RX ORDER — POTASSIUM CHLORIDE 20 MEQ/1
40 TABLET, EXTENDED RELEASE ORAL ONCE
Status: COMPLETED | OUTPATIENT
Start: 2023-06-25 | End: 2023-06-25

## 2023-06-25 RX ADMIN — ONDANSETRON 4 MG: 2 INJECTION INTRAMUSCULAR; INTRAVENOUS at 21:47

## 2023-06-25 RX ADMIN — SENNOSIDES 8.6 MG: 8.6 TABLET, FILM COATED ORAL at 08:35

## 2023-06-25 RX ADMIN — VANCOMYCIN HYDROCHLORIDE 750 MG: 750 INJECTION, SOLUTION INTRAVENOUS at 05:05

## 2023-06-25 RX ADMIN — CEFEPIME HYDROCHLORIDE 2000 MG: 2 INJECTION, SOLUTION INTRAVENOUS at 11:53

## 2023-06-25 RX ADMIN — DOCUSATE SODIUM 100 MG: 100 CAPSULE, LIQUID FILLED ORAL at 17:07

## 2023-06-25 RX ADMIN — KETOROLAC TROMETHAMINE 15 MG: 30 INJECTION, SOLUTION INTRAMUSCULAR; INTRAVENOUS at 15:26

## 2023-06-25 RX ADMIN — PRAVASTATIN SODIUM 20 MG: 20 TABLET ORAL at 17:15

## 2023-06-25 RX ADMIN — VANCOMYCIN HYDROCHLORIDE 750 MG: 750 INJECTION, SOLUTION INTRAVENOUS at 17:07

## 2023-06-25 RX ADMIN — POLYETHYLENE GLYCOL 3350 17 G: 17 POWDER, FOR SOLUTION ORAL at 08:35

## 2023-06-25 RX ADMIN — ASPIRIN 81 MG CHEWABLE TABLET 81 MG: 81 TABLET CHEWABLE at 08:35

## 2023-06-25 RX ADMIN — POTASSIUM CHLORIDE 40 MEQ: 1500 TABLET, EXTENDED RELEASE ORAL at 08:35

## 2023-06-25 RX ADMIN — KETOROLAC TROMETHAMINE 15 MG: 30 INJECTION, SOLUTION INTRAMUSCULAR; INTRAVENOUS at 02:50

## 2023-06-25 RX ADMIN — ENOXAPARIN SODIUM 40 MG: 100 INJECTION SUBCUTANEOUS at 08:35

## 2023-06-25 RX ADMIN — SERTRALINE HYDROCHLORIDE 25 MG: 50 TABLET ORAL at 08:35

## 2023-06-25 RX ADMIN — DOCUSATE SODIUM 100 MG: 100 CAPSULE, LIQUID FILLED ORAL at 08:35

## 2023-06-25 RX ADMIN — ACETAMINOPHEN 650 MG: 325 TABLET ORAL at 21:47

## 2023-06-25 RX ADMIN — KETOROLAC TROMETHAMINE 15 MG: 30 INJECTION, SOLUTION INTRAMUSCULAR; INTRAVENOUS at 08:52

## 2023-06-25 RX ADMIN — HYDROCHLOROTHIAZIDE 12.5 MG: 12.5 TABLET ORAL at 08:35

## 2023-06-25 NOTE — PROGRESS NOTES
Ronal Waldrop is a 57 y.o. female who is currently ordered Vancomycin IV with management by the Pharmacy Consult service. Relevant clinical data and objective / subjective history reviewed. Vancomycin Assessment:  Indication and Goal AUC/Trough: Soft tissue (goal -600, trough >10), -600, trough >10  Clinical Status: stable  Micro:     Renal Function:  SCr: 0.82 mg/dL  CrCl: 71.3 mL/min  Renal replacement: Not on dialysis  Days of Therapy: 2  Current Dose: 750 mg Q 12 H  Vancomycin Plan:  New Dosing: no change  Estimated AUC: 401 mcg*hr/mL  Estimated Trough: 13.1 mcg/mL  Next Level: 0530 on 6/26/23  Renal Function Monitoring: Daily BMP and UOP  Pharmacy will continue to follow closely for s/sx of nephrotoxicity, infusion reactions and appropriateness of therapy. BMP and CBC will be ordered per protocol. We will continue to follow the patient’s culture results and clinical progress daily.     Lili Smith, Pharmacist

## 2023-06-25 NOTE — UTILIZATION REVIEW
NOTIFICATION OF INPATIENT ADMISSION   AUTHORIZATION REQUEST   SERVICING FACILITY:   [address] [place] [address]  [address] [facility],Third [address] [address]  Tax ID: [id number]  NPI: [id number] ATTENDING PROVIDER:  Attending Name and NPI#: Sebastián Boothe [[id number]]  Address: [address] [facility],Third [address] [address]  Phone: [phone]   ADMISSION INFORMATION:  Place of Service: [address] [place] [address]  Place of Service Code: 21  Inpatient Admission Date/Time: 6/24/23  2:06 PM  Discharge Date/Time: No discharge date for patient encounter. Admitting Diagnosis Code/Description:  Abscess [L02.91]  Lymphadenopathy [R59.1]  Cellulitis of right buttock [L03.317]     UTILIZATION REVIEW CONTACT:  Jerome Borges Utilization   Network Utilization Review Department  Phone: [phone]  Fax [phone]  Email: [email or web address]  Contact for approvals/pending authorizations, clinical reviews, and discharge. PHYSICIAN ADVISORY SERVICES:  Medical Necessity Denial & Lg[facility]-pu-Khz[facility] Review  Phone: [phone]  Fax: [phone]  Email: [email or web address]

## 2023-06-25 NOTE — PLAN OF CARE
Problem: METABOLIC, FLUID AND ELECTROLYTES - ADULT  Goal: Electrolytes maintained within normal limits  Description: INTERVENTIONS:  - Monitor labs and assess patient for signs and symptoms of electrolyte imbalances  - Administer electrolyte replacement as ordered  - Monitor response to electrolyte replacements, including repeat lab results as appropriate  - Instruct patient on fluid and nutrition as appropriate  Outcome: Progressing  Goal: Fluid balance maintained  Description: INTERVENTIONS:  - Monitor labs   - Monitor I/O and WT  - Instruct patient on fluid and nutrition as appropriate  - Assess for signs & symptoms of volume excess or deficit  Outcome: Progressing  Goal: Glucose maintained within target range  Description: INTERVENTIONS:  - Monitor Blood Glucose as ordered  - Assess for signs and symptoms of hyperglycemia and hypoglycemia  - Administer ordered medications to maintain glucose within target range  - Assess nutritional intake and initiate nutrition service referral as needed  Outcome: Progressing     Problem: SKIN/TISSUE INTEGRITY - ADULT  Goal: Skin Integrity remains intact(Skin Breakdown Prevention)  Description: Assess:  -Perform Girish assessment every   -Clean and moisturize skin every   -Inspect skin when repositioning, toileting, and assisting with ADLS  -Assess under medical devices such as  every   -Assess extremities for adequate circulation and sensation     Bed Management:  -Have minimal linens on bed & keep smooth, unwrinkled  -Change linens as needed when moist or perspiring  -Avoid sitting or lying in one position for more than  hours while in bed  -Keep HOB at degrees     Toileting:  -Offer bedside commode  -Assess for incontinence every   -Use incontinent care products after each incontinent episode such as     Activity:  -Mobilize patient  times a day  -Encourage activity and walks on unit  -Encourage or provide ROM exercises   -Turn and reposition patient every  Hours  -Use appropriate equipment to lift or move patient in bed  -Instruct/ Assist with weight shifting every  when out of bed in chair  -Consider limitation of chair time  hour intervals    Skin Care:  -Avoid use of baby powder, tape, friction and shearing, hot water or constrictive clothing  -Relieve pressure over bony prominences using   -Do not massage red bony areas    Next Steps:  -Teach patient strategies to minimize risks such as    -Consider consults to  interdisciplinary teams such as   Outcome: Progressing  Goal: Incision(s), wounds(s) or drain site(s) healing without S/S of infection  Description: INTERVENTIONS  - Assess and document dressing, incision, wound bed, drain sites and surrounding tissue  - Provide patient and family education  - Perform skin care/dressing changes every   Outcome: Progressing  Goal: Pressure injury heals and does not worsen  Description: Interventions:  - Implement low air loss mattress or specialty surface (Criteria met)  - Apply silicone foam dressing  - Instruct/assist with weight shifting every  minutes when in chair   - Limit chair time to  hour intervals  - Use special pressure reducing interventions such as  when in chair   - Apply fecal or urinary incontinence containment device   - Perform passive or active ROM every   - Turn and reposition patient & offload bony prominences every  hours   - Utilize friction reducing device or surface for transfers   - Consider consults to  interdisciplinary teams such as   - Use incontinent care products after each incontinent episode such   - Consider nutrition services referral as needed  Outcome: Progressing     Problem: Prexisting or High Potential for Compromised Skin Integrity  Goal: Skin integrity is maintained or improved  Description: INTERVENTIONS:  - Identify patients at risk for skin breakdown  - Assess and monitor skin integrity  - Assess and monitor nutrition and hydration status  - Monitor labs   - Assess for incontinence   - Turn and reposition patient  - Assist with mobility/ambulation  - Relieve pressure over bony prominences  - Avoid friction and shearing  - Provide appropriate hygiene as needed including keeping skin clean and dry  - Evaluate need for skin moisturizer/barrier cream  - Collaborate with interdisciplinary team   - Patient/family teaching  - Consider wound care consult   Outcome: Progressing

## 2023-06-25 NOTE — ASSESSMENT & PLAN NOTE
Cont vanc, cefepime  Await blood cultures  Await wound cultures  apprec surgery - no acute surgical intervention, no role for I+D   Pain control

## 2023-06-25 NOTE — UTILIZATION REVIEW
Initial Clinical Review    Admission: Date/Time/Statement:   Admission Orders (From admission, onward)     Ordered        06/24/23 1406  INPATIENT ADMISSION  Once                      Orders Placed This Encounter   Procedures   • INPATIENT ADMISSION     Standing Status:   Standing     Number of Occurrences:   1     Order Specific Question:   Level of Care     Answer:   Med Surg [16]     Order Specific Question:   Estimated length of stay     Answer:   More than 2 Midnights     Order Specific Question:   Certification     Answer:   I certify that inpatient services are medically necessary for this patient for a duration of greater than two midnights. See H&P and MD Progress Notes for additional information about the patient's course of treatment. ED Arrival Information     Expected   -    Arrival   6/24/2023 11:16    Acuity   Urgent            Means of arrival   Walk-In    Escorted by   Spouse    Service   Hospitalist    Admission type   Emergency            Arrival complaint   butt abscess           Chief Complaint   Patient presents with   • Abscess     Pt to ED from urgent care for cody rectal abscess on butt. Pt states it is hot and painful. Started on Tuesday and has just gotten bigger, started the size of a quarter and now about the size of palm. Initial Presentation:   57 yof to ER sent from Urgent Care with worsening draining abscess R buttocks, started 5 days ago. Hx HLD, HTN, obesity. Presents febrile with Abscess to right buttocks with significant surrounding erythema. Abscess TTP, indurated, no fluctuance. Se image below. Admission work-up showing leukocytosis, hypokalemia. Admitted to inpatient status for cellulitis R buttock. Started on double IVABT, cultures pending.               Date: 6/25/23   Day 2:   Double IVABT in progress for cellulitis R buttock. Await blood & wound cultures. No change in wound from above. Using IV Toradol for pain control.      Per surg: R buttock cellulitis. Right buttock - tender to palpation, erythema, warmth. No crepitus. Continue IVABT, pain control. ED Triage Vitals   Temperature Pulse Respirations Blood Pressure SpO2   06/24/23 1123 06/24/23 1123 06/24/23 1123 06/24/23 1123 06/24/23 1123   99.1 °F (37.3 °C) 90 18 149/75 99 %      Temp Source Heart Rate Source Patient Position - Orthostatic VS BP Location FiO2 (%)   06/25/23 0714 06/24/23 1419 06/24/23 1123 06/24/23 1123 --   Oral Monitor Sitting Left arm       Pain Score       06/24/23 1123       10 - Worst Possible Pain          Wt Readings from Last 1 Encounters:   06/25/23 86 kg (189 lb 9.5 oz)     Additional Vital Signs:   Date/Time Temp Pulse Resp BP MAP (mmHg) SpO2 Calculated FIO2 (%) - Nasal Cannula Nasal Cannula O2 Flow Rate (L/min) O2 Device Patient Position - Orthostatic VS   06/25/23 0900 -- -- -- -- -- 95 % -- -- None (Room air) --   06/25/23 07:14:04 98.6 °F (37 °C) 79 -- 132/70 91 95 % 24 1 L/min Nasal cannula Lying   06/24/23 2300 -- -- -- -- -- 94 % 24 1 L/min Nasal cannula --   06/24/23 19:45:18 98.2 °F (36.8 °C) 91 16 97/52 67 91 % -- -- -- --   06/24/23 1900 -- 96 20 124/58 84 93 % -- -- None (Room air) --   06/24/23 1800 -- 87 20 136/62 89 91 % -- -- -- --   06/24/23 1419 -- 77 20 128/70 92 93 % -- -- -- Lying   06/24/23 1123 99.1 °F (37.3 °C) 90 18 149/75 -- 99 % -- -- None (Room air) Sitting     Pertinent Labs/Diagnostic Test Results:   CT pelvis w contrast   Final Result (06/24 0141)      1. Skin thickening and infiltration in the right inferomedial buttock region. No abscess. 2.  Enlarged lymph nodes in the right lower quadrant mesentery of uncertain significance. Recommend follow-up CT abdomen pelvis in 1-2 months to assess for additional lymphadenopathy and change. Right pelvic lymph nodes as noted above probably reactive.         6/24 Ekg=  Normal sinus rhythm    Results from last 7 days   Lab Units 06/25/23  0450 06/24/23  1200   WBC Thousand/uL 11.83* 14.46* HEMOGLOBIN g/dL 10.8* 11.6   HEMATOCRIT % 33.6* 35.9   PLATELETS Thousands/uL 200 207   NEUTROS ABS Thousands/µL 8.57* 10.86*     Results from last 7 days   Lab Units 06/25/23  0450 06/24/23  1200   SODIUM mmol/L 139 138   POTASSIUM mmol/L 3.4* 2.9*   CHLORIDE mmol/L 101 95*   CO2 mmol/L 32 38*   ANION GAP mmol/L 6 5   BUN mg/dL 11 10   CREATININE mg/dL 0.82 0.81   EGFR ml/min/1.73sq m 75 76   CALCIUM mg/dL 8.0* 8.9   MAGNESIUM mg/dL  --  2.2     Results from last 7 days   Lab Units 06/25/23  0450 06/24/23  1200   AST U/L 13 15   ALT U/L 10 11   ALK PHOS U/L 42 48   TOTAL PROTEIN g/dL 6.2* 7.1   ALBUMIN g/dL 3.3* 3.7   TOTAL BILIRUBIN mg/dL 0.42 0.52     Results from last 7 days   Lab Units 06/25/23  0450 06/24/23  1200   GLUCOSE RANDOM mg/dL 102 103     Results from last 7 days   Lab Units 06/24/23  1200   PROCALCITONIN ng/ml <0.05     Results from last 7 days   Lab Units 06/24/23  1200   LACTIC ACID mmol/L 0.7     Results from last 7 days   Lab Units 06/24/23  1210 06/24/23  1200   BLOOD CULTURE   --  Received in Microbiology Lab. Culture in Progress. Received in Microbiology Lab. Culture in Progress.    GRAM STAIN RESULT  No polys seen*  1+ Gram positive cocci in pairs*  --      ED Treatment:   Medication Administration from 06/24/2023 1115 to 06/24/2023 1937       Date/Time Order Dose Route Action     06/24/2023 1206 EDT sodium chloride 0.9 % bolus 1,000 mL 1,000 mL Intravenous New Bag     06/24/2023 1207 EDT ketorolac (TORADOL) injection 15 mg 15 mg Intravenous Given     06/24/2023 1324 EDT potassium chloride 20 mEq IVPB (premix) 20 mEq Intravenous New Bag     06/24/2023 1333 EDT potassium chloride (K-DUR,KLOR-CON) CR tablet 40 mEq 40 mEq Oral Given     06/24/2023 1324 EDT magnesium sulfate IVPB (premix) SOLN 1 g 1 g Intravenous New Bag     06/24/2023 1250 EDT cefepime (MAXIPIME) IVPB (premix in dextrose) 2,000 mg 50 mL 2,000 mg Intravenous New Bag     06/24/2023 1412 EDT vancomycin (VANCOCIN) 1250 mg in sodium chloride 0.9% 250 mL IVPB 1,250 mg Intravenous New Bag     06/24/2023 1302 EDT iohexol (OMNIPAQUE) 350 MG/ML injection (SINGLE-DOSE) 100 mL 100 mL Intravenous Given     06/24/2023 1816 EDT pravastatin (PRAVACHOL) tablet 20 mg 20 mg Oral Given     06/24/2023 1816 EDT docusate sodium (COLACE) capsule 100 mg 100 mg Oral Given     06/24/2023 1815 EDT ketorolac (TORADOL) injection 15 mg 15 mg Intravenous Given        Admitting Diagnosis: Abscess [L02.91]  Lymphadenopathy [R59.1]  Cellulitis of right buttock [L03.317]  Age/Sex: 57 y.o. female  Admission Orders:  Consult surgery  Pt/ot eval & tx  Scd/foot pumps  Telemetry     Scheduled Medications:  aspirin, 81 mg, Oral, Daily  cefepime, 2,000 mg, Intravenous, Q12H  docusate sodium, 100 mg, Oral, BID  enoxaparin, 40 mg, Subcutaneous, Daily  hydrochlorothiazide, 12.5 mg, Oral, Daily  polyethylene glycol, 17 g, Oral, Daily  potassium chloride (K-DUR,KLOR-CON) CR tablet 40 mEq, Once 6/25  pravastatin, 20 mg, Ora4, repeat 6/25, Daily With Dinner  senna, 1 tablet, Oral, Daily  sertraline, 25 mg, Oral, Daily  vancomycin, 750 mg, Intravenous, Q12H    PRN Meds:  acetaminophen, 650 mg, Oral, Q6H PRN  ketorolac, 15 mg, Intravenous, Q6H PRN, 6/24 x1, 6/25 x2  ondansetron, 4 mg, Intravenous, Q6H PRN    Network Utilization Review Department  ATTENTION: Please call with any questions or concerns to [phone] and carefully listen to the prompts so that you are directed to the right person. All voicemails are confidential.  Carie Hernandez all requests for admission clinical reviews, approved or denied determinations and any other requests to dedicated fax number below belonging to the campus where the patient is receiving treatment.  List of dedicated fax numbers for the Facilities:  [place] DENIALS (Administrative/Medical Necessity) [phone]   [facility] (Maternity/NICU/Pediatrics) [address] [place] [address] [address] [facility] [phone]   [address] [facility] [address] [phone]   [address] [place] [address] [phone]

## 2023-06-25 NOTE — PLAN OF CARE
Problem: METABOLIC, FLUID AND ELECTROLYTES - ADULT  Goal: Electrolytes maintained within normal limits  Description: INTERVENTIONS:  - Monitor labs and assess patient for signs and symptoms of electrolyte imbalances  - Administer electrolyte replacement as ordered  - Monitor response to electrolyte replacements, including repeat lab results as appropriate  - Instruct patient on fluid and nutrition as appropriate  Outcome: Progressing  Goal: Fluid balance maintained  Description: INTERVENTIONS:  - Monitor labs   - Monitor I/O and WT  - Instruct patient on fluid and nutrition as appropriate  - Assess for signs & symptoms of volume excess or deficit  Outcome: Progressing  Goal: Glucose maintained within target range  Description: INTERVENTIONS:  - Monitor Blood Glucose as ordered  - Assess for signs and symptoms of hyperglycemia and hypoglycemia  - Administer ordered medications to maintain glucose within target range  - Assess nutritional intake and initiate nutrition service referral as needed  Outcome: Progressing     Problem: SKIN/TISSUE INTEGRITY - ADULT  Goal: Skin Integrity remains intact(Skin Breakdown Prevention)  Description: Assess:  -Perform Girish assessment every X  -Clean and moisturize skin every X  -Inspect skin when repositioning, toileting, and assisting with ADLS  -Assess under medical devices such as X every X  -Assess extremities for adequate circulation and sensation     Bed Management:  -Have minimal linens on bed & keep smooth, unwrinkled  -Change linens as needed when moist or perspiring  -Avoid sitting or lying in one position for more than X hours while in bed  -Keep HOB at [employer]     Toileting:  -Offer bedside commode  -Assess for incontinence every X  -Use incontinent care products after each incontinent episode such as X    Activity:  -Mobilize patient X times a day  -Encourage activity and walks on unit  -Encourage or provide ROM exercises   -Turn and reposition patient every X Hours  -Use appropriate equipment to lift or move patient in bed  -Instruct/ Assist with weight shifting every QMIEHBSF when out of bed in chair  -Consider limitation of chair time X hour intervals    Skin Care:  -Avoid use of baby powder, tape, friction and shearing, hot water or constrictive clothing  -Relieve pressure over bony prominences using X  -Do not massage red bony areas    Next Steps:  -Teach patient strategies to minimize risks such as X   -Consider consults to  interdisciplinary teams such as X  Outcome: Progressing  Goal: Incision(s), wounds(s) or drain site(s) healing without S/S of infection  Description: INTERVENTIONS  - Assess and document dressing, incision, wound bed, drain sites and surrounding tissue  - Provide patient and family education  - Perform skin care/dressing changes every X  Outcome: Progressing  Goal: Pressure injury heals and does not worsen  Description: Interventions:  - Implement low air loss mattress or specialty surface (Criteria met)  - Apply silicone foam dressing  - Instruct/assist with weight shifting every X minutes when in chair   - Limit chair time to X hour intervals  - Use special pressure reducing interventions such as X when in chair   - Apply fecal or urinary incontinence containment device   - Perform passive or active ROM every X  - Turn and reposition patient & offload bony prominences every X hours   - Utilize friction reducing device or surface for transfers   - Consider consults to  interdisciplinary teams such as X  - Use incontinent care products after each incontinent episode such as X  - Consider nutrition services referral as needed  Outcome: Progressing     Problem: Prexisting or High Potential for Compromised Skin Integrity  Goal: Skin integrity is maintained or improved  Description: INTERVENTIONS:  - Identify patients at risk for skin breakdown  - Assess and monitor skin integrity  - Assess and monitor nutrition and hydration status  - Monitor labs - Assess for incontinence   - Turn and reposition patient  - Assist with mobility/ambulation  - Relieve pressure over bony prominences  - Avoid friction and shearing  - Provide appropriate hygiene as needed including keeping skin clean and dry  - Evaluate need for skin moisturizer/barrier cream  - Collaborate with interdisciplinary team   - Patient/family teaching  - Consider wound care consult   Outcome: Progressing

## 2023-06-25 NOTE — PROGRESS NOTES
Progress Note - General Surgery   Amee Vicente 57 y.o. female MRN: [id number]  Unit/Bed#: -01 Encounter: [id number]    Assessment:  Patient is a 57 y.o. female who presented with right buttock cellulitis. WBC - 11.83 (14.46)    Plan:  - continue IV antibiotics  - pain control    Subjective/Objective     Subjective:   No acute events overnight. Objective:    Blood pressure 132/70, pulse 79, temperature 98.6 °F (37 °C), temperature source Oral, resp. rate 16, height 5' 2.5" (1.588 m), weight 86 kg (189 lb 9.5 oz), SpO2 95 %. ,Body mass index is 34.12 kg/m². Intake/Output Summary (Last 24 hours) at 6/25/2023 9622  Last data filed at 6/24/2023 1524  Gross per 24 hour   Intake 1250 ml   Output --   Net 1250 ml       Invasive Devices     Peripheral Intravenous Line  Duration           Peripheral IV 06/24/23 Left Antecubital <1 day    Peripheral IV 06/24/23 Right Antecubital <1 day                Physical Exam:   Gen:  Well-developed, well-nourished female in NAD. CV: RRR  Lungs: Normal respiratory effort  Abd: soft, nontender, nondistended  Neuro: AxO x3  Right buttock - tender to palpation, erythema, warmth. No crepitus.           Results from last 7 days   Lab Units 06/25/23  0450 06/24/23  1200   WBC Thousand/uL 11.83* 14.46*   HEMOGLOBIN g/dL 10.8* 11.6   HEMATOCRIT % 33.6* 35.9   PLATELETS Thousands/uL 200 207     Results from last 7 days   Lab Units 06/25/23  0450 06/24/23  1200   POTASSIUM mmol/L 3.4* 2.9*   CHLORIDE mmol/L 101 95*   CO2 mmol/L 32 38*   BUN mg/dL 11 10   CREATININE mg/dL 0.82 0.81   CALCIUM mg/dL 8.0* 8.9

## 2023-06-26 PROBLEM — E66.09 CLASS 1 OBESITY DUE TO EXCESS CALORIES WITH SERIOUS COMORBIDITY AND BODY MASS INDEX (BMI) OF 34.0 TO 34.9 IN ADULT: Status: ACTIVE | Noted: 2023-06-24

## 2023-06-26 PROBLEM — E78.49 OTHER HYPERLIPIDEMIA: Status: ACTIVE | Noted: 2023-06-24

## 2023-06-26 LAB
ALBUMIN SERPL BCP-MCNC: 3.3 G/DL (ref 3.5–5)
ALP SERPL-CCNC: 42 U/L (ref 34–104)
ALT SERPL W P-5'-P-CCNC: 8 U/L (ref 7–52)
ANION GAP SERPL CALCULATED.3IONS-SCNC: 6 MMOL/L
AST SERPL W P-5'-P-CCNC: 13 U/L (ref 13–39)
BASOPHILS # BLD AUTO: 0.04 THOUSANDS/ÂΜL (ref 0–0.1)
BASOPHILS NFR BLD AUTO: 0 % (ref 0–1)
BILIRUB SERPL-MCNC: 0.42 MG/DL (ref 0.2–1)
BUN SERPL-MCNC: 11 MG/DL (ref 5–25)
CALCIUM ALBUM COR SERPL-MCNC: 8.8 MG/DL (ref 8.3–10.1)
CALCIUM SERPL-MCNC: 8.2 MG/DL (ref 8.4–10.2)
CHLORIDE SERPL-SCNC: 101 MMOL/L (ref 96–108)
CO2 SERPL-SCNC: 32 MMOL/L (ref 21–32)
CREAT SERPL-MCNC: 0.68 MG/DL (ref 0.6–1.3)
EOSINOPHIL # BLD AUTO: 0.08 THOUSAND/ÂΜL (ref 0–0.61)
EOSINOPHIL NFR BLD AUTO: 1 % (ref 0–6)
ERYTHROCYTE [DISTWIDTH] IN BLOOD BY AUTOMATED COUNT: 13.5 % (ref 11.6–15.1)
GFR SERPL CREATININE-BSD FRML MDRD: 92 ML/MIN/1.73SQ M
GLUCOSE SERPL-MCNC: 96 MG/DL (ref 65–140)
HCT VFR BLD AUTO: 34.3 % (ref 34.8–46.1)
HGB BLD-MCNC: 11.1 G/DL (ref 11.5–15.4)
IMM GRANULOCYTES # BLD AUTO: 0.06 THOUSAND/UL (ref 0–0.2)
IMM GRANULOCYTES NFR BLD AUTO: 1 % (ref 0–2)
LYMPHOCYTES # BLD AUTO: 2.2 THOUSANDS/ÂΜL (ref 0.6–4.47)
LYMPHOCYTES NFR BLD AUTO: 19 % (ref 14–44)
MCH RBC QN AUTO: 29.5 PG (ref 26.8–34.3)
MCHC RBC AUTO-ENTMCNC: 32.4 G/DL (ref 31.4–37.4)
MCV RBC AUTO: 91 FL (ref 82–98)
MONOCYTES # BLD AUTO: 1.31 THOUSAND/ÂΜL (ref 0.17–1.22)
MONOCYTES NFR BLD AUTO: 11 % (ref 4–12)
NEUTROPHILS # BLD AUTO: 8.14 THOUSANDS/ÂΜL (ref 1.85–7.62)
NEUTS SEG NFR BLD AUTO: 68 % (ref 43–75)
NRBC BLD AUTO-RTO: 0 /100 WBCS
PLATELET # BLD AUTO: 211 THOUSANDS/UL (ref 149–390)
PMV BLD AUTO: 10.3 FL (ref 8.9–12.7)
POTASSIUM SERPL-SCNC: 3.5 MMOL/L (ref 3.5–5.3)
PROT SERPL-MCNC: 6.4 G/DL (ref 6.4–8.4)
RBC # BLD AUTO: 3.76 MILLION/UL (ref 3.81–5.12)
SODIUM SERPL-SCNC: 139 MMOL/L (ref 135–147)
VANCOMYCIN TROUGH SERPL-MCNC: 8.4 UG/ML (ref 10–20)
WBC # BLD AUTO: 11.83 THOUSAND/UL (ref 4.31–10.16)

## 2023-06-26 PROCEDURE — 80202 ASSAY OF VANCOMYCIN: CPT | Performed by: HOSPITALIST

## 2023-06-26 PROCEDURE — 87147 CULTURE TYPE IMMUNOLOGIC: CPT | Performed by: PHYSICIAN ASSISTANT

## 2023-06-26 PROCEDURE — 87205 SMEAR GRAM STAIN: CPT | Performed by: PHYSICIAN ASSISTANT

## 2023-06-26 PROCEDURE — NC001 PR NO CHARGE: Performed by: PHYSICIAN ASSISTANT

## 2023-06-26 PROCEDURE — 87186 SC STD MICRODIL/AGAR DIL: CPT | Performed by: PHYSICIAN ASSISTANT

## 2023-06-26 PROCEDURE — 99233 SBSQ HOSP IP/OBS HIGH 50: CPT | Performed by: STUDENT IN AN ORGANIZED HEALTH CARE EDUCATION/TRAINING PROGRAM

## 2023-06-26 PROCEDURE — 80053 COMPREHEN METABOLIC PANEL: CPT | Performed by: HOSPITALIST

## 2023-06-26 PROCEDURE — 99232 SBSQ HOSP IP/OBS MODERATE 35: CPT | Performed by: PHYSICIAN ASSISTANT

## 2023-06-26 PROCEDURE — 87070 CULTURE OTHR SPECIMN AEROBIC: CPT | Performed by: PHYSICIAN ASSISTANT

## 2023-06-26 PROCEDURE — 10061 I&D ABSCESS COMP/MULTIPLE: CPT | Performed by: PHYSICIAN ASSISTANT

## 2023-06-26 PROCEDURE — 85025 COMPLETE CBC W/AUTO DIFF WBC: CPT | Performed by: HOSPITALIST

## 2023-06-26 PROCEDURE — 0J993ZZ DRAINAGE OF BUTTOCK SUBCUTANEOUS TISSUE AND FASCIA, PERCUTANEOUS APPROACH: ICD-10-PCS | Performed by: SURGERY

## 2023-06-26 PROCEDURE — 87075 CULTR BACTERIA EXCEPT BLOOD: CPT | Performed by: PHYSICIAN ASSISTANT

## 2023-06-26 RX ORDER — VANCOMYCIN HYDROCHLORIDE 1 G/200ML
1000 INJECTION, SOLUTION INTRAVENOUS EVERY 12 HOURS
Status: DISCONTINUED | OUTPATIENT
Start: 2023-06-26 | End: 2023-06-28

## 2023-06-26 RX ORDER — OXYCODONE HYDROCHLORIDE 5 MG/1
5 TABLET ORAL EVERY 4 HOURS PRN
Status: DISCONTINUED | OUTPATIENT
Start: 2023-06-26 | End: 2023-06-27

## 2023-06-26 RX ORDER — LIDOCAINE HYDROCHLORIDE 10 MG/ML
20 INJECTION, SOLUTION EPIDURAL; INFILTRATION; INTRACAUDAL; PERINEURAL ONCE
Status: COMPLETED | OUTPATIENT
Start: 2023-06-26 | End: 2023-06-26

## 2023-06-26 RX ORDER — POTASSIUM CHLORIDE 20 MEQ/1
40 TABLET, EXTENDED RELEASE ORAL ONCE
Status: COMPLETED | OUTPATIENT
Start: 2023-06-26 | End: 2023-06-26

## 2023-06-26 RX ADMIN — VANCOMYCIN HYDROCHLORIDE 1000 MG: 1 INJECTION, SOLUTION INTRAVENOUS at 13:41

## 2023-06-26 RX ADMIN — VANCOMYCIN HYDROCHLORIDE 750 MG: 750 INJECTION, SOLUTION INTRAVENOUS at 05:43

## 2023-06-26 RX ADMIN — POTASSIUM CHLORIDE 40 MEQ: 1500 TABLET, EXTENDED RELEASE ORAL at 07:52

## 2023-06-26 RX ADMIN — HYDROCHLOROTHIAZIDE 12.5 MG: 12.5 TABLET ORAL at 08:01

## 2023-06-26 RX ADMIN — DOCUSATE SODIUM 100 MG: 100 CAPSULE, LIQUID FILLED ORAL at 18:00

## 2023-06-26 RX ADMIN — SENNOSIDES 8.6 MG: 8.6 TABLET, FILM COATED ORAL at 08:01

## 2023-06-26 RX ADMIN — KETOROLAC TROMETHAMINE 15 MG: 30 INJECTION, SOLUTION INTRAMUSCULAR; INTRAVENOUS at 06:07

## 2023-06-26 RX ADMIN — MORPHINE SULFATE 2 MG: 2 INJECTION, SOLUTION INTRAMUSCULAR; INTRAVENOUS at 11:48

## 2023-06-26 RX ADMIN — PRAVASTATIN SODIUM 20 MG: 20 TABLET ORAL at 16:39

## 2023-06-26 RX ADMIN — CEFEPIME HYDROCHLORIDE 2000 MG: 2 INJECTION, SOLUTION INTRAVENOUS at 12:19

## 2023-06-26 RX ADMIN — DOCUSATE SODIUM 100 MG: 100 CAPSULE, LIQUID FILLED ORAL at 08:01

## 2023-06-26 RX ADMIN — POLYETHYLENE GLYCOL 3350 17 G: 17 POWDER, FOR SOLUTION ORAL at 08:00

## 2023-06-26 RX ADMIN — ENOXAPARIN SODIUM 40 MG: 100 INJECTION SUBCUTANEOUS at 08:01

## 2023-06-26 RX ADMIN — CEFEPIME HYDROCHLORIDE 2000 MG: 2 INJECTION, SOLUTION INTRAVENOUS at 23:40

## 2023-06-26 RX ADMIN — CEFEPIME HYDROCHLORIDE 2000 MG: 2 INJECTION, SOLUTION INTRAVENOUS at 00:39

## 2023-06-26 RX ADMIN — KETOROLAC TROMETHAMINE 15 MG: 30 INJECTION, SOLUTION INTRAMUSCULAR; INTRAVENOUS at 12:18

## 2023-06-26 RX ADMIN — ASPIRIN 81 MG CHEWABLE TABLET 81 MG: 81 TABLET CHEWABLE at 08:01

## 2023-06-26 RX ADMIN — LIDOCAINE HYDROCHLORIDE 20 ML: 10 INJECTION, SOLUTION EPIDURAL; INFILTRATION; INTRACAUDAL; PERINEURAL at 12:27

## 2023-06-26 RX ADMIN — SERTRALINE HYDROCHLORIDE 25 MG: 50 TABLET ORAL at 08:02

## 2023-06-26 NOTE — PROGRESS NOTES
Ramyond Giles is a 57 y.o. female who is currently ordered Vancomycin IV with management by the Pharmacy Consult service. Relevant clinical data and objective / subjective history reviewed. Vancomycin Assessment:  Indication and Goal AUC/Trough: Soft tissue (goal -600, trough >10), -600, trough >10  Clinical Status: stable  Micro:     Renal Function:  SCr: 0.68 mg/dL  CrCl: 86 mL/min  Renal replacement: Not on dialysis  Days of Therapy: 3  Current Dose: 750 mg Q 12 H  Vancomycin Plan:  New Dosinmg q12h  Estimated AUC: 422 mcg*hr/mL  Estimated Trough: 12.8 mcg/mL  Next Level: 0600 on 7/3  Renal Function Monitoring: Daily BMP and UOP  Pharmacy will continue to follow closely for s/sx of nephrotoxicity, infusion reactions and appropriateness of therapy. BMP and CBC will be ordered per protocol. We will continue to follow the patient’s culture results and clinical progress daily.     Jamila Wallace, Pharmacist

## 2023-06-26 NOTE — PROCEDURES
Incision and drain    Date/Time: 6/26/2023 11:55 AM    Performed by: Edmundo Fontaine PA-C  Authorized by: Edmundo Fontaine PA-C  [facility] Protocol:  Procedure performed by: Marixa Gallardo PA-C)  Consent: Verbal consent obtained. Written consent obtained. Risks and benefits: risks, benefits and alternatives were discussed  Consent given by: patient  Time out: Immediately prior to procedure a "time out" was called to verify the correct patient, procedure, equipment, support staff and site/side marked as required. Patient understanding: patient states understanding of the procedure being performed  Patient consent: the patient's understanding of the procedure matches consent given  Site marked: the operative site was marked  Required items: required blood products, implants, devices, and special equipment available  Patient identity confirmed: verbally with patient      Patient location:  Bedside  Location:     Type:  Abscess    Size:  3 cm    Location:  Anogenital    Anogenital location: right buttock. Pre-procedure details:     Skin preparation:  Betadine  Anesthesia (see MAR for exact dosages): Anesthesia method:  Local infiltration    Local anesthetic:  Lidocaine 1% w/o epi (10 ml)  Procedure details:     Complexity:  Intermediate    Needle aspiration: no      Incision types:  Elliptical    Scalpel blade:  15    Approach:  Open    Incision depth:  Subcutaneous    Wound management:  Probed and deloculated, irrigated with saline and debrided    Drainage:  Bloody and purulent    Drainage amount: Moderate    Wound treatment:  Packing placed    Packing materials:  1/2 in iodoform gauze (50 cm)    Amount 1/2" iodoform:  50 cm  Post-procedure details:     Patient tolerance of procedure:   Tolerated well, no immediate complications  Comments:      Underlying purulent pocket, cultures obtained  Mild bleeding improved with packing  Dry sterile dressing placed  Continue wound care as ordered        Aleksander Galvez Ivette

## 2023-06-26 NOTE — CASE MANAGEMENT
Case Management Assessment & Discharge Planning Note    Patient name Agustín Pickett  Location /-66 MRN [id number]  : 1960 Date 2023       Current Admission Date: 2023  Current Admission Diagnosis:Cellulitis of buttock, right   Patient Active Problem List    Diagnosis Date Noted   • Class 1 obesity due to excess calories with serious comorbidity and body mass index (BMI) of 34.0 to 34.9 in adult 2023   • Cellulitis of buttock, right 2023   • Primary hypertension 2023   • Other hyperlipidemia 2023      LOS (days): 2  Geometric Mean LOS (GMLOS) (days):   Days to GMLOS:     OBJECTIVE:    Risk of Unplanned Readmission Score: 8         Current admission status: Inpatient       [facility]erred Pharmacy:   [id number], [address] [place] [address]  Phone: [phone] Fax: [phone]    Primary Care Provider: Mercedes Navarrete DO    Primary Insurance: Susi Moses  Secondary Insurance:     ASSESSMENT:  Josselin Proxies    There are no active Health Care Proxies on file. Advance Directives  Does patient have a [address] [place] [address] Avenue?: No  Was patient offered paperwork?: Yes (declined)  Does patient currently have a Health Care decision maker?: Yes, please see Health Care Proxy section  Does patient have Advance Directives?: No  Was patient offered paperwork?: Yes (declined)  Primary Contact: [address] [place] [address]         Readmission Root Cause  30 Day Readmission: No    Patient Information  Admitted from[de-identified] Home  Mental Status: Alert  During Assessment patient was accompanied by: Not accompanied during assessment  Assessment information provided by[de-identified] Patient  Primary Caregiver: Self  Support Systems: Spouse/significant other  [place] of Residence: [address] [place] [address] Street do you live in?: [address] [place] [address] access options.  Select all that apply.: Ramp  Type of Current Residence: [facility]  In the last 12 months, was there a time when you were not able to pay the mortgage or rent on time?: No  In the last 12 months, how many places have you lived?: 1  In the last 12 months, was there a time when you did not have a steady place to sleep or slept in a shelter (including now)?: No  Homeless/housing insecurity resource given?: N/A  Living Arrangements: Lives w/ Spouse/significant other  Is patient a ?: No    Activities of Daily Living Prior to Admission  Functional Status: Independent  Completes ADLs independently?: Yes  Ambulates independently?: Yes  Does patient use assisted devices?: No  Does patient currently own DME?: No  Does patient have a history of Outpatient Therapy (PT/OT)?: Yes  Does the patient have a history of Short-Term Rehab?: No  Does patient have a history of HHC?: Yes  Does patient currently have [address]?: No         Patient Information Continued  Income Source: Employed  Does patient have prescription coverage?: Yes  Within the past 12 months, you worried that your food would run out before you got the money to buy more.: Never true  Within the past 12 months, the food you bought just didn't last and you didn't have money to get more.: Never true  Food insecurity resource given?: N/A  Does patient receive dialysis treatments?: No  Does patient have a history of substance abuse?: No  Does patient have a history of Mental Health Diagnosis?: No         Means of Transportation  Means of Transport to Appts[de-identified] Drives Self  In the past 12 months, has lack of transportation kept you from medical appointments or from getting medications?: No  In the past 12 months, has lack of transportation kept you from meetings, work, or from getting things needed for daily living?: No  Was application for public transport provided?: N/A        DISCHARGE DETAILS:    Discharge planning discussed with[de-identified] patient at bedside  Freedom of Choice: Yes  Comments - Freedom of Choice: disucssed dc planning and role of Cm  CM contacted family/caregiver?: No- see comments (pt alert and indpt in jaclyn m)  Were Treatment Team discharge recommendations reviewed with patient/caregiver?: Yes  Did patient/caregiver verbalize understanding of patient care needs?: Yes       Contacts  Reason/Outcome: Discharge [address]         Is the patient interested in [facility] at discharge?: No    DME Referral Provided  Referral made for DME?: No    Other Referral/Resources/Interventions Provided:  Interventions: None Indicated  Referral Comments: No needs anitcipated at this time pending wound cultures            Discharge Destination Plan[de-identified] Home  Transport at Discharge : Family                                      Additional Comments: Cm met with pt. Pt indpt at baseline. She reports that she has been ambulating in room without difficulty. Pt resides with her spouse in a mobile home with a ramp entrance. She has no DME at home . Pt works and drives. She has had [facility] before when she had her knee done. She has no hx of STR/MH/Da. Pt has partiicpated in OP PT/OT before. Pt sees [facility] and uses [facility] pharmacy.

## 2023-06-26 NOTE — CASE MANAGEMENT
Case Management Progress Note    Patient name Rosie Pa  Location /-57 MRN [id number]  : 1960 Date 2023       LOS (days): 2  Geometric Mean LOS (GMLOS) (days):   Days to GMLOS:        OBJECTIVE:        Current admission status: Inpatient  [facility]erred [facility]acy:   [id number] [address] [address]  Phone: [phone] Fax: [phone]    Primary Care Provider: Reji Johns DO    Primary Insurance: Johnny "[facility]  Secondary Insurance:     PROGRESS NOTE: Cm reviewed note from Surgery after assessment was entered. Surgery is recommending [address] for pt for wound packing. Cm discussed the recommendation with pt and she is not interested in [address] for the wound. She would like teaching prior to DC. She states her  can help her with it. Cm notified nursing that teaching will be needed.

## 2023-06-26 NOTE — PLAN OF CARE
Problem: METABOLIC, FLUID AND ELECTROLYTES - ADULT  Goal: Electrolytes maintained within normal limits  Description: INTERVENTIONS:  - Monitor labs and assess patient for signs and symptoms of electrolyte imbalances  - Administer electrolyte replacement as ordered  - Monitor response to electrolyte replacements, including repeat lab results as appropriate  - Instruct patient on fluid and nutrition as appropriate  Outcome: Progressing  Goal: Fluid balance maintained  Description: INTERVENTIONS:  - Monitor labs   - Monitor I/O and WT  - Instruct patient on fluid and nutrition as appropriate  - Assess for signs & symptoms of volume excess or deficit  Outcome: Progressing  Goal: Glucose maintained within target range  Description: INTERVENTIONS:  - Monitor Blood Glucose as ordered  - Assess for signs and symptoms of hyperglycemia and hypoglycemia  - Administer ordered medications to maintain glucose within target range  - Assess nutritional intake and initiate nutrition service referral as needed  Outcome: Progressing     Problem: SKIN/TISSUE INTEGRITY - ADULT  Goal: Skin Integrity remains intact(Skin Breakdown Prevention)  Description: Assess:  -Perform Girish assessment every x  -Clean and moisturize skin every x  -Inspect skin when repositioning, toileting, and assisting with ADLS  -Assess under medical devices such as x every x  -Assess extremities for adequate circulation and sensation     Bed Management:  -Have minimal linens on bed & keep smooth, unwrinkled  -Change linens as needed when moist or perspiring  -Avoid sitting or lying in one position for more than x hours while in bed  -Keep HOB at [facility]     Toileting:  -Offer bedside commode  -Assess for incontinence every x  -Use incontinent care products after each incontinent episode such as x    Activity:  -Mobilize patient x times a day  -Encourage activity and walks on unit  -Encourage or provide ROM exercises   -Turn and reposition patient every x Hours  -Use appropriate equipment to lift or move patient in bed  -Instruct/ Assist with weight shifting every x when out of bed in chair  -Consider limitation of chair time x hour intervals    Skin Care:  -Avoid use of baby powder, tape, friction and shearing, hot water or constrictive clothing  -Relieve pressure over bony prominences using x  -Do not massage red bony areas    Next Steps:  -Teach patient strategies to minimize risks such as x   -Consider consults to  interdisciplinary teams such as x  Outcome: Progressing  Goal: Incision(s), wounds(s) or drain site(s) healing without S/S of infection  Description: INTERVENTIONS  - Assess and document dressing, incision, wound bed, drain sites and surrounding tissue  - Provide patient and family education  - Perform skin care/dressing changes every x  Outcome: Progressing  Goal: Pressure injury heals and does not worsen  Description: Interventions:  - Implement low air loss mattress or specialty surface (Criteria met)  - Apply silicone foam dressing  - Instruct/assist with weight shifting every x minutes when in chair   - Limit chair time to x hour intervals  - Use special pressure reducing interventions such as x when in chair   - Apply fecal or urinary incontinence containment device   - Perform passive or active ROM every x  - Turn and reposition patient & offload bony prominences every x hours   - Utilize friction reducing device or surface for transfers   - Consider consults to  interdisciplinary teams such as x  - Use incontinent care products after each incontinent episode such as x  - Consider nutrition services referral as needed  Outcome: Progressing     Problem: Prexisting or High Potential for Compromised Skin Integrity  Goal: Skin integrity is maintained or improved  Description: INTERVENTIONS:  - Identify patients at risk for skin breakdown  - Assess and monitor skin integrity  - Assess and monitor nutrition and hydration status  - Monitor labs   - Assess for incontinence   - Turn and reposition patient  - Assist with mobility/ambulation  - Relieve pressure over bony prominences  - Avoid friction and shearing  - Provide appropriate hygiene as needed including keeping skin clean and dry  - Evaluate need for skin moisturizer/barrier cream  - Collaborate with interdisciplinary team   - Patient/family teaching  - Consider wound care consult   Outcome: Progressing

## 2023-06-26 NOTE — PROGRESS NOTES
Progress Note - General Surgery   Susan Plater 57 y.o. female MRN: [id number]  Unit/Bed#: -01 Encounter: [id number]    Assessment/Plan:  Right buttock wound/abscess  -Patient admitted 2 days ago, being treated with IV antibiotics with no significant improvement  -Incision and drainage done at bedside today, purulent drainage noted (see separate procedure note)  -Repeat cultures obtained  -Wound packing placed  -Continue local wound care as ordered  -Pain control as needed  -Continue IV antibiotics  -Follow culture results  -Patient will need home care for assistance with packing changes, will consult case management    HLD, HTN, Obesity  -Medical management    Subjective/Objective   Chief Complaint: Right buttock pain    Subjective: Continues with pain and minimal drainage at area. Denies fevers or chills. Tolerating diet    Objective:     Blood pressure 120/52, pulse 74, temperature 98.3 °F (36.8 °C), resp. rate 16, height 5' 2.5" (1.588 m), weight 86.4 kg (190 lb 7.6 oz), SpO2 96 %. ,Body mass index is 34.28 kg/m². Intake/Output Summary (Last 24 hours) at 6/26/2023 1219  Last data filed at 6/26/2023 1017  Gross per 24 hour   Intake 840 ml   Output --   Net 840 ml       Invasive Devices     Peripheral Intravenous Line  Duration           Peripheral IV 06/26/23 Dorsal (posterior); Right Hand <1 day                Physical Exam:   General appearance: alert and oriented, in no acute distress  Head: Normocephalic, without obvious abnormality, atraumatic, sclerae anicteric, mucous membranes moist  Neck: no JVD and supple, symmetrical, trachea midline  Lungs: clear to auscultation, no wheezes or rales  Heart:   Regular rate and rhythm, S1-S2 normal, no murmur  Abdomen:   Flat, soft, nontender, active bowel sounds  Extremities:   No edema, redness or tenderness in the calves or thighs  Skin: Right buttock with necrotic wound with minimal serous drainage, significant tenderness, surrounding induration, underlying fluctuance  Nursing notes and vital signs reviewed      Lab, Imaging and other studies:  I have personally reviewed pertinent lab results.   , CBC:   Lab Results   Component Value Date    WBC 11.83 (H) 06/26/2023    HGB 11.1 (L) 06/26/2023    HCT 34.3 (L) 06/26/2023    MCV 91 06/26/2023     06/26/2023    RBC 3.76 (L) 06/26/2023    MCH 29.5 06/26/2023    MCHC 32.4 06/26/2023    RDW 13.5 06/26/2023    MPV 10.3 06/26/2023    NRBC 0 06/26/2023   , CMP:   Lab Results   Component Value Date    SODIUM 139 06/26/2023    K 3.5 06/26/2023     06/26/2023    CO2 32 06/26/2023    BUN 11 06/26/2023    CREATININE 0.68 06/26/2023    CALCIUM 8.2 (L) 06/26/2023    AST 13 06/26/2023    ALT 8 06/26/2023    ALKPHOS 42 06/26/2023    EGFR 92 06/26/2023     VTE Pharmacologic Prophylaxis: Enoxaparin (Lovenox)  VTE Mechanical Prophylaxis: sequential compression device     Radha Steele

## 2023-06-26 NOTE — PLAN OF CARE
Problem: METABOLIC, FLUID AND ELECTROLYTES - ADULT  Goal: Electrolytes maintained within normal limits  Description: INTERVENTIONS:  - Monitor labs and assess patient for signs and symptoms of electrolyte imbalances  - Administer electrolyte replacement as ordered  - Monitor response to electrolyte replacements, including repeat lab results as appropriate  - Instruct patient on fluid and nutrition as appropriate  Outcome: Progressing  Goal: Fluid balance maintained  Description: INTERVENTIONS:  - Monitor labs   - Monitor I/O and WT  - Instruct patient on fluid and nutrition as appropriate  - Assess for signs & symptoms of volume excess or deficit  Outcome: Progressing  Goal: Glucose maintained within target range  Description: INTERVENTIONS:  - Monitor Blood Glucose as ordered  - Assess for signs and symptoms of hyperglycemia and hypoglycemia  - Administer ordered medications to maintain glucose within target range  - Assess nutritional intake and initiate nutrition service referral as needed  Outcome: Progressing     Problem: SKIN/TISSUE INTEGRITY - ADULT  Goal: Skin Integrity remains intact(Skin Breakdown Prevention)  Description: Assess:  -Perform Girish assessment every shift  -Clean and moisturize skin every shift  -Inspect skin when repositioning, toileting, and assisting with ADLS  -Assess under medical devices such as pumps every 2hr  -Assess extremities for adequate circulation and sensation     Bed Management:  -Have minimal linens on bed & keep smooth, unwrinkled  -Change linens as needed when moist or perspiring  -Avoid sitting or lying in one position for more than 2 hours while in bed  -Keep HOB at degrees     Toileting:  -Offer bedside commode  -Assess for incontinence every 2  -Use incontinent care products after each incontinent episode such as pad    Activity:  -Mobilize patient 3 times a day  -Encourage activity and walks on unit  -Encourage or provide ROM exercises   -Turn and reposition patient every 2 Hours  -Use appropriate equipment to lift or move patient in bed  -Instruct/ Assist with weight shifting every 2 when out of bed in chair  -Consider limitation of chair time 2 hour intervals    Skin Care:  -Avoid use of baby powder, tape, friction and shearing, hot water or constrictive clothing  -Relieve pressure over bony prominences using chair cushion  -Do not massage red bony areas    Next Steps:  -Teach patient strategies to minimize risks such as weight shifting, using call bell   -Consider consults to  interdisciplinary teams such as PT/OT  Outcome: Progressing  Goal: Incision(s), wounds(s) or drain site(s) healing without S/S of infection  Description: INTERVENTIONS  - Assess and document dressing, incision, wound bed, drain sites and surrounding tissue  - Provide patient and family education  - Perform skin care/dressing changes as needed/q2h  Outcome: Progressing  Goal: Pressure injury heals and does not worsen  Description: Interventions:  - Implement low air loss mattress or specialty surface (Criteria met)  - Apply silicone foam dressing  - Instruct/assist with weight shifting every 15 minutes when in chair   - Limit chair time to 2 hour intervals  - Use special pressure reducing interventions such as air cushion when in chair   - Apply fecal or urinary incontinence containment device   - Perform passive or active ROM every   - Turn and reposition patient & offload bony prominences every 2 hours   - Utilize friction reducing device or surface for transfers   - Consider consults to  interdisciplinary teams such as pt/ot/nutrition  - Use incontinent care products after each incontinent episode such as wipes  - Consider nutrition services referral as needed  Outcome: Progressing     Problem: Prexisting or High Potential for Compromised Skin Integrity  Goal: Skin integrity is maintained or improved  Description: INTERVENTIONS:  - Identify patients at risk for skin breakdown  - Assess and monitor skin integrity  - Assess and monitor nutrition and hydration status  - Monitor labs   - Assess for incontinence   - Turn and reposition patient  - Assist with mobility/ambulation  - Relieve pressure over bony prominences  - Avoid friction and shearing  - Provide appropriate hygiene as needed including keeping skin clean and dry  - Evaluate need for skin moisturizer/barrier cream  - Collaborate with interdisciplinary team   - Patient/family teaching  - Consider wound care consult   Outcome: Progressing

## 2023-06-26 NOTE — ASSESSMENT & PLAN NOTE
Cont vanc, cefepime  Await blood cultures  Await wound cultures  Surgery following-  I+D today 6/26  F/u wound cultures   Pain control

## 2023-06-26 NOTE — ASSESSMENT & PLAN NOTE
Cont vanc, cefepime  Blood cultures NGTD @ 48h  Await wound cultures growing staph and strept  Surgery following-  I+D today 6/26  Pain control

## 2023-06-26 NOTE — PROGRESS NOTES
[facility]  Progress Note  Name: Yane Vieira  MRN: [id number]  Unit/Bed#: -01 I Date of Admission: 6/24/2023   Date of Service: 6/26/2023 I Hospital Day: 2    Assessment/Plan   * Cellulitis of buttock, right  Assessment & Plan  Cont vanc, cefepime  Await blood cultures  Await wound cultures  Surgery following-  I+D today 6/26  F/u wound cultures   Pain control     Other hyperlipidemia  Assessment & Plan  Cont zocor. Primary hypertension  Assessment & Plan  Cont microzide. Obesity  Assessment & Plan  Contributes to all aspects of care  Weight loss encouraged         VTE Pharmacologic Prophylaxis: VTE Score: 5 Moderate Risk (Score 3-4) - Pharmacological DVT Prophylaxis Ordered: enoxaparin (Lovenox). Patient Centered Rounds: I performed bedside rounds with nursing staff today. Discussions with Specialists or Other Care Team Provider: Surgery, CM    Education and Discussions with Family / Patient: Patient declined call to . Total Time Spent on Date of Encounter in care of patient: 35 minutes This time was spent on one or more of the following: performing physical exam; counseling and coordination of care; obtaining or reviewing history; documenting in the medical record; reviewing/ordering tests, medications or procedures; communicating with other healthcare professionals and discussing with patient's family/caregivers. Current Length of Stay: 2 day(s)  Current Patient Status: Inpatient   Certification Statement: The patient will continue to require additional inpatient hospital stay due to cellulitis/abscess  Discharge Plan: Anticipate discharge in >72 hrs to home. Code Status: Level 1 - Full Code    Subjective:   Sarah Bull was seen and examined at bedside. No acute events overnight. Discussed plan of care. All questions and concerns were answered and addressed. Continues to have significant right glut pain due to the infection.   Is unable to put pressure on that side. Otherwise no other acute complaints. Objective:     Vitals:   Temp (24hrs), Av.1 °F (37.3 °C), Min:98.3 °F (36.8 °C), Max:99.9 °F (37.7 °C)    Temp:  [98.3 °F (36.8 °C)-99.9 °F (37.7 °C)] 98.3 °F (36.8 °C)  HR:  [74-91] 74  Resp:  [16] 16  BP: (120-159)/(52-76) 120/52  SpO2:  [90 %-96 %] 96 %  Body mass index is 34.28 kg/m². Input and Output Summary (last 24 hours): Intake/Output Summary (Last 24 hours) at 2023 1154  Last data filed at 2023 1017  Gross per 24 hour   Intake 840 ml   Output --   Net 840 ml       Physical Exam:   Physical Exam  Vitals and nursing note reviewed. Constitutional:       Appearance: Normal appearance. She is obese. HENT:      Head: Normocephalic and atraumatic. Cardiovascular:      Rate and Rhythm: Normal rate and regular rhythm. Pulses: Normal pulses. Heart sounds: Normal heart sounds. Pulmonary:      Effort: Pulmonary effort is normal.      Breath sounds: Normal breath sounds. Abdominal:      General: Abdomen is flat. Bowel sounds are normal.      Palpations: Abdomen is soft. Musculoskeletal:      Right lower leg: No edema. Left lower leg: No edema. Skin:     General: Skin is warm. Findings: Erythema, lesion and rash present. Neurological:      General: No focal deficit present. Mental Status: She is alert and oriented to person, place, and time.           Additional Data:     Labs:  Results from last 7 days   Lab Units 23  0506   WBC Thousand/uL 11.83*   HEMOGLOBIN g/dL 11.1*   HEMATOCRIT % 34.3*   PLATELETS Thousands/uL 211   NEUTROS PCT % 68   LYMPHS PCT % 19   MONOS PCT % 11   EOS PCT % 1     Results from last 7 days   Lab Units 23  0506   SODIUM mmol/L 139   POTASSIUM mmol/L 3.5   CHLORIDE mmol/L 101   CO2 mmol/L 32   BUN mg/dL 11   CREATININE mg/dL 0.68   ANION GAP mmol/L 6   CALCIUM mg/dL 8.2*   ALBUMIN g/dL 3.3*   TOTAL BILIRUBIN mg/dL 0.42   ALK PHOS U/L 42   ALT U/L 8   AST U/L 13   GLUCOSE RANDOM mg/dL 96                 Results from last 7 days   Lab Units 06/24/23  1200   LACTIC ACID mmol/L 0.7   PROCALCITONIN ng/ml <0.05       Lines/Drains:  Invasive Devices     Peripheral Intravenous Line  Duration           Peripheral IV 06/26/23 Dorsal (posterior); Right Hand <1 day                      Imaging: No pertinent imaging reviewed. Recent Cultures (last 7 days):   Results from last 7 days   Lab Units 06/24/23  1210 06/24/23  1200   BLOOD CULTURE   --  No Growth at 24 hrs. No Growth at 24 hrs. GRAM STAIN RESULT  No polys seen*  1+ Gram positive cocci in pairs*  --        Last 24 Hours Medication List:   Current Facility-Administered Medications   Medication Dose Route Frequency Provider Last Rate   • acetaminophen  650 mg Oral Q6H PRN Phil Shah MD     • aspirin  81 mg Oral Daily Phil Shah MD     • cefepime  2,000 mg Intravenous Q12H Phil Shah MD 2,000 mg (06/26/23 0039)   • docusate sodium  100 mg Oral BID Phil Shah MD     • enoxaparin  40 mg Subcutaneous Daily Phil Shah MD     • hydrochlorothiazide  12.5 mg Oral Daily Mike Ni MD     • ketorolac  15 mg Intravenous Q6H PRN Phil Shah MD     • lidocaine (PF)  20 mL Infiltration Once Grace Steele PA-C     • ondansetron  4 mg Intravenous Q6H PRN Phil Shah MD     • polyethylene glycol  17 g Oral Daily Phil Shah MD     • pravastatin  20 mg Oral Daily With Charleen Parker MD     • senna  1 tablet Oral Daily Phil Shah MD     • sertraline  25 mg Oral Daily Phil Shah MD     • vancomycin  1,000 mg Intravenous Q12H Phil Shah MD          Today, Patient Was Seen By: Shabnam Alvares MD    **Please Note: This note may have been constructed using a voice recognition system. **

## 2023-06-27 LAB
ANION GAP SERPL CALCULATED.3IONS-SCNC: 6 MMOL/L
BACTERIA WND AEROBE CULT: ABNORMAL
BUN SERPL-MCNC: 10 MG/DL (ref 5–25)
CALCIUM SERPL-MCNC: 8.5 MG/DL (ref 8.4–10.2)
CHLORIDE SERPL-SCNC: 100 MMOL/L (ref 96–108)
CO2 SERPL-SCNC: 31 MMOL/L (ref 21–32)
CREAT SERPL-MCNC: 0.73 MG/DL (ref 0.6–1.3)
ERYTHROCYTE [DISTWIDTH] IN BLOOD BY AUTOMATED COUNT: 13.4 % (ref 11.6–15.1)
GFR SERPL CREATININE-BSD FRML MDRD: 87 ML/MIN/1.73SQ M
GLUCOSE SERPL-MCNC: 94 MG/DL (ref 65–140)
GRAM STN SPEC: ABNORMAL
GRAM STN SPEC: ABNORMAL
HCT VFR BLD AUTO: 37.1 % (ref 34.8–46.1)
HGB BLD-MCNC: 11.9 G/DL (ref 11.5–15.4)
MCH RBC QN AUTO: 29.4 PG (ref 26.8–34.3)
MCHC RBC AUTO-ENTMCNC: 32.1 G/DL (ref 31.4–37.4)
MCV RBC AUTO: 92 FL (ref 82–98)
PLATELET # BLD AUTO: 230 THOUSANDS/UL (ref 149–390)
PMV BLD AUTO: 10.1 FL (ref 8.9–12.7)
POTASSIUM SERPL-SCNC: 3.8 MMOL/L (ref 3.5–5.3)
RBC # BLD AUTO: 4.05 MILLION/UL (ref 3.81–5.12)
SODIUM SERPL-SCNC: 137 MMOL/L (ref 135–147)
VANCOMYCIN SERPL-MCNC: 32.9 UG/ML (ref 10–20)
WBC # BLD AUTO: 9.42 THOUSAND/UL (ref 4.31–10.16)

## 2023-06-27 PROCEDURE — 80048 BASIC METABOLIC PNL TOTAL CA: CPT | Performed by: STUDENT IN AN ORGANIZED HEALTH CARE EDUCATION/TRAINING PROGRAM

## 2023-06-27 PROCEDURE — 99233 SBSQ HOSP IP/OBS HIGH 50: CPT | Performed by: STUDENT IN AN ORGANIZED HEALTH CARE EDUCATION/TRAINING PROGRAM

## 2023-06-27 PROCEDURE — 85027 COMPLETE CBC AUTOMATED: CPT | Performed by: STUDENT IN AN ORGANIZED HEALTH CARE EDUCATION/TRAINING PROGRAM

## 2023-06-27 PROCEDURE — 99232 SBSQ HOSP IP/OBS MODERATE 35: CPT | Performed by: PHYSICIAN ASSISTANT

## 2023-06-27 PROCEDURE — 80202 ASSAY OF VANCOMYCIN: CPT | Performed by: STUDENT IN AN ORGANIZED HEALTH CARE EDUCATION/TRAINING PROGRAM

## 2023-06-27 RX ORDER — KETOROLAC TROMETHAMINE 30 MG/ML
15 INJECTION, SOLUTION INTRAMUSCULAR; INTRAVENOUS EVERY 6 HOURS PRN
Status: DISCONTINUED | OUTPATIENT
Start: 2023-06-27 | End: 2023-06-27

## 2023-06-27 RX ORDER — OXYCODONE HYDROCHLORIDE 5 MG/1
5 TABLET ORAL EVERY 6 HOURS PRN
Status: DISCONTINUED | OUTPATIENT
Start: 2023-06-27 | End: 2023-06-29 | Stop reason: HOSPADM

## 2023-06-27 RX ADMIN — ENOXAPARIN SODIUM 40 MG: 100 INJECTION SUBCUTANEOUS at 10:00

## 2023-06-27 RX ADMIN — CEFEPIME HYDROCHLORIDE 2000 MG: 2 INJECTION, SOLUTION INTRAVENOUS at 12:37

## 2023-06-27 RX ADMIN — VANCOMYCIN HYDROCHLORIDE 1000 MG: 1 INJECTION, SOLUTION INTRAVENOUS at 00:33

## 2023-06-27 RX ADMIN — HYDROCHLOROTHIAZIDE 12.5 MG: 12.5 TABLET ORAL at 10:01

## 2023-06-27 RX ADMIN — PRAVASTATIN SODIUM 20 MG: 20 TABLET ORAL at 17:07

## 2023-06-27 RX ADMIN — CEFEPIME HYDROCHLORIDE 2000 MG: 2 INJECTION, SOLUTION INTRAVENOUS at 23:51

## 2023-06-27 RX ADMIN — SERTRALINE HYDROCHLORIDE 25 MG: 50 TABLET ORAL at 10:00

## 2023-06-27 RX ADMIN — KETOROLAC TROMETHAMINE 15 MG: 30 INJECTION, SOLUTION INTRAMUSCULAR; INTRAVENOUS at 12:49

## 2023-06-27 RX ADMIN — ASPIRIN 81 MG CHEWABLE TABLET 81 MG: 81 TABLET CHEWABLE at 10:01

## 2023-06-27 RX ADMIN — VANCOMYCIN HYDROCHLORIDE 1000 MG: 1 INJECTION, SOLUTION INTRAVENOUS at 13:50

## 2023-06-27 RX ADMIN — DOCUSATE SODIUM 100 MG: 100 CAPSULE, LIQUID FILLED ORAL at 17:07

## 2023-06-27 NOTE — DISCHARGE INSTR - AVS FIRST PAGE
Wound care  Remove packing daily and shower over right buttock incision. Have 1/2 inch packing replaced to base of wound with 1 inch tail outside wound site. Do not over pack wound, will obstruct drainage. Continue packing until wound can no longer be packed.   Continue full course of antibiotics  Follow-up in wound care center or surgical office in 1 week      You are to follow-up with your PCP and wound care in 1 week    You will be discharged with Augmentin and Flagyl for 7 days  You are to take Florastor/probiotic for 10 days

## 2023-06-27 NOTE — CASE MANAGEMENT
Case Management Discharge Planning Note    Patient name Alfred Duckworth  Location /-71 MRN [id number]  : 1960 Date 2023       Current Admission Date: 2023  Current Admission Diagnosis:Cellulitis of buttock, right   Patient Active Problem List    Diagnosis Date Noted   • Class 1 obesity due to excess calories with serious comorbidity and body mass index (BMI) of 34.0 to 34.9 in adult 2023   • Cellulitis of buttock, right 2023   • Primary hypertension 2023   • Other hyperlipidemia 2023      LOS (days): 3  Geometric Mean LOS (GMLOS) (days):   Days to GMLOS:     OBJECTIVE:  Risk of Unplanned Readmission Score: 6.22         Current admission status: Inpatient   Preferred Pharmacy:   [id number], [address] [facility]  Phone: [phone] Fax: [phone]    Primary Care Provider: Waleska Ortiz DO    Primary Insurance: [id number] [facility]  Secondary Insurance:     DISCHARGE DETAILS:    Additional Comments: Met with Pt re: VNA upon discharge. Pt reports she wants to see how involved the wound packing is before making decision about VNA. Pt reports she was told by provider that she will be here until end of week. CM to follow up with Pt closer to discharge.

## 2023-06-27 NOTE — PROGRESS NOTES
Progress Note - General Surgery   ShaziaKaleida Healtho 57 y.o. female MRN: [id number]  Unit/Bed#: -01 Encounter: [id number]    Assessment/Plan:  Right buttock wound/abscess  -POD#1 s/p I&D at bedside  -Gram stain with 3+ GPC in pairs  -Leukocytosis improved today  -Previous cultures growing Staph aureus, awaiting sensitivities  -Continue empiric antibiotics  -Continue local wound care as ordered, packing changed today with purulent drainage, continued erythema with less induration  -Continue pain control as needed  -Case management consulted for VNA assistance at discharge  -Follow culture results     HLD, HTN, Obesity  -Medical management    Subjective/Objective   Chief Complaint: pain    Subjective: Complains of pain at incision site. Less pressure. Denies any fevers or chills. Tolerating diet. Objective:     Blood pressure 147/81, pulse 76, temperature 98.1 °F (36.7 °C), resp. rate 18, height 5' 2.5" (1.588 m), weight 79.5 kg (175 lb 3.2 oz), SpO2 90 %. ,Body mass index is 31.53 kg/m². Intake/Output Summary (Last 24 hours) at 6/27/2023 1049  Last data filed at 6/27/2023 0844  Gross per 24 hour   Intake 777 ml   Output --   Net 777 ml       Invasive Devices     Peripheral Intravenous Line  Duration           Peripheral IV 06/26/23 Dorsal (posterior); Right Hand 1 day    Peripheral IV 06/26/23 Dorsal (posterior); Left Hand <1 day                Physical Exam:   General appearance: alert and oriented, in no acute distress  Head: Normocephalic, without obvious abnormality, atraumatic, sclerae anicteric, mucous membranes moist  Neck: no JVD and supple, symmetrical, trachea midline  Lungs: clear to auscultation, no wheezes or rales  Heart:   Regular rate and rhythm, S1-S2 normal, no murmur  Abdomen:   Flat, soft, nontender, active bowel sounds  Extremities:   No edema, redness or tenderness in the calves or thighs  Skin: Warm, dry;   Right buttock incision site with surrounding erythema, less induration, continues with purulent drainage    Nursing notes and vital signs reviewed      Lab, Imaging and other studies:  I have personally reviewed pertinent lab results.   , CBC:   Lab Results   Component Value Date    WBC 9.42 06/27/2023    HGB 11.9 06/27/2023    HCT 37.1 06/27/2023    MCV 92 06/27/2023     06/27/2023    RBC 4.05 06/27/2023    MCH 29.4 06/27/2023    MCHC 32.1 06/27/2023    RDW 13.4 06/27/2023    MPV 10.1 06/27/2023   , CMP:   Lab Results   Component Value Date    SODIUM 137 06/27/2023    K 3.8 06/27/2023     06/27/2023    CO2 31 06/27/2023    BUN 10 06/27/2023    CREATININE 0.73 06/27/2023    CALCIUM 8.5 06/27/2023    EGFR 87 06/27/2023     VTE Pharmacologic Prophylaxis: Heparin  VTE Mechanical Prophylaxis: sequential compression device     Katya Steele

## 2023-06-27 NOTE — OCCUPATIONAL THERAPY NOTE
Occupational Therapy Screen Note     Patient Name: Haydee Lyon  [facility] Date: 6/27/2023  Problem List  Principal Problem:    Cellulitis of buttock, right  Active Problems:    Class 1 obesity due to excess calories with serious comorbidity and body mass index (BMI) of 34.0 to 34.9 in adult    Primary hypertension    Other hyperlipidemia          06/27/23 1408   OT Last Visit   OT Visit Date 06/27/23   Note Type   Note type Screen   Additional Comments OT orders received, chart review performed. Per nursing, pt has been independent within room.  Pt w/ no acute OT needs, will DC OT           Jonn, OTR/L

## 2023-06-27 NOTE — UTILIZATION REVIEW
Continued Stay Review    Date: 6/27/23                          Current Patient Class: inpatient   Current Level of Care: med surg    HPI:62 y.o. female initially admitted on 6/24/23 inpatient due to cellulitis of right buttocks. Started on cefepime and vancomycin. Surgery consulted     Assessment/Plan:   6/26/23 no improvement in right buttocks abscess. Has continued pain and minimal drainage at area. On exam: Right buttock with necrotic wound with minimal serous drainage, significant tenderness, surrounding induration, underlying fluctuance. Wbc 11.83. Plan is continued antibiotics. Pain control. I&D at bedside    6/26/23 Procedure I&D abscess right buttocks. Size 3 cm. Drainage bloody and purulent. Probed and deloculated, irrigated with saline and debrided. Packing placed about 50 cm.    6/27/23   Has pain at incision site on buttocks, less pressure. On exam: Right buttock incision site with surrounding erythema, less induration, continues with purulent drainage. Wbc 9.42. Gram stain with 3+ GPC in pairs. Previous cultures growing Staph aureus, awaiting sensitivities. Plan is continued antibiotics. Pain control. Local wound care, packing changed today. Vital Signs: Room air  06/27/23 07:21:59 98.1 °F (36.7 °C) 76 18 147/81 103 90 % -- -- -- --   06/26/23 21:13:45 98.1 °F (36.7 °C) 82 16 130/67 88 89 % Abnormal  -- -- -- --   06/26/23 13:44:03 98.4 °F (36.9 °C) 75 17 123/57 79 85 % Abnormal  -- -- -- --   06/26/23 07:28:32 98.3 °F (36.8 °C) 74 16 120/52 75 96 %         Pertinent Labs/Diagnostic Results:   CT pelvis w contrast   Final Result by Melinda Dave MD (06/24 2062)      1. Skin thickening and infiltration in the right inferomedial buttock region. No abscess. 2.  Enlarged lymph nodes in the right lower quadrant mesentery of uncertain significance. Recommend follow-up CT abdomen pelvis in 1-2 months to assess for additional lymphadenopathy and change.  Right pelvic lymph nodes as noted above probably reactive. Workstation performed: [id number]           Results from last 7 days   Lab Units 06/27/23  0258 06/26/23  0506 06/25/23  0450 06/24/23  1200   WBC Thousand/uL 9.42 11.83* 11.83* 14.46*   HEMOGLOBIN g/dL 11.9 11.1* 10.8* 11.6   HEMATOCRIT % 37.1 34.3* 33.6* 35.9   PLATELETS Thousands/uL 230 211 200 207   NEUTROS ABS Thousands/µL  --  8.14* 8.57* 10.86*     Results from last 7 days   Lab Units 06/27/23  0258 06/26/23  0506 06/25/23  0450 06/24/23  1200   SODIUM mmol/L 137 139 139 138   POTASSIUM mmol/L 3.8 3.5 3.4* 2.9*   CHLORIDE mmol/L 100 101 101 95*   CO2 mmol/L 31 32 32 38*   ANION GAP mmol/L 6 6 6 5   BUN mg/dL 10 11 11 10   CREATININE mg/dL 0.73 0.68 0.82 0.81   EGFR ml/min/1.73sq m 87 92 75 76   CALCIUM mg/dL 8.5 8.2* 8.0* 8.9   MAGNESIUM mg/dL  --   --   --  2.2     Results from last 7 days   Lab Units 06/26/23  0506 06/25/23  0450 06/24/23  1200   AST U/L 13 13 15   ALT U/L 8 10 11   ALK PHOS U/L 42 42 48   TOTAL PROTEIN g/dL 6.4 6.2* 7.1   ALBUMIN g/dL 3.3* 3.3* 3.7   TOTAL BILIRUBIN mg/dL 0.42 0.42 0.52     Results from last 7 days   Lab Units 06/27/23  0258 06/26/23  0506 06/25/23  0450 06/24/23  1200   GLUCOSE RANDOM mg/dL 94 96 102 103     Results from last 7 days   Lab Units 06/24/23  1200   PROCALCITONIN ng/ml <0.05     Results from last 7 days   Lab Units 06/24/23  1200   LACTIC ACID mmol/L 0.7     Results from last 7 days   Lab Units 06/26/23  1226 06/24/23  1210 06/24/23  1200   BLOOD CULTURE   --   --  No Growth at 48 hrs. No Growth at 48 hrs.    GRAM STAIN RESULT  4+ Polys*  3+ Gram positive cocci in clusters* No polys seen*  1+ Gram positive cocci in pairs*  --    WOUND CULTURE  4+ Growth of Staphylococcus aureus* 4+ Growth of Staphylococcus aureus*  --      Results from last 7 days   Lab Units 06/27/23  0258 06/26/23  0506   VANCOMYCIN RM ug/mL 32.9*  --    VANCOMYCIN TR ug/mL  --  8.4*       Medications:   Scheduled Medications:  aspirin, 81 mg, Oral, Daily  cefepime, 2,000 mg, Intravenous, Q12H  docusate sodium, 100 mg, Oral, BID  enoxaparin, 40 mg, Subcutaneous, Daily  hydrochlorothiazide, 12.5 mg, Oral, Daily  polyethylene glycol, 17 g, Oral, Daily  pravastatin, 20 mg, Oral, Daily With Dinner  senna, 1 tablet, Oral, Daily  sertraline, 25 mg, Oral, Daily  vancomycin, 1,000 mg, Intravenous, Q12H    morphine injection 2 mg  Dose: 2 mg  Freq: Once Route: IV  Start: 06/26/23 1100 End: 06/26/23 1148    potassium chloride (K-DUR,KLOR-CON) CR tablet 40 mEq  Dose: 40 mEq  Freq: Once Route: PO  Start: 06/26/23 0715 End: 06/26/23 0752    Continuous IV Infusions: none      PRN Meds:  acetaminophen, 650 mg, Oral, Q6H PRN  ketorolac, 15 mg, Intravenous, Q6H PRN x 2 6/26, x 1 6/27  ondansetron, 4 mg, Intravenous, Q6H PRN  oxyCODONE, 5 mg, Oral, Q4H PRN    Discharge Plan: To be determined     Network Utilization Review Department  ATTENTION: Please call with any questions or concerns to [phone] and carefully listen to the prompts so that you are directed to the right person. All voicemails are confidential.  Troy Mohs all requests for admission clinical reviews, approved or denied determinations and any other requests to dedicated fax number below belonging to the campus where the patient is receiving treatment.  List of dedicated fax numbers for the Facilities:  [place] DENIALS (Administrative/Medical Necessity) [phone] [address] [facility] (Maternity/NICU/Pediatrics) [phone]   [address] [place] [address] Drive [phone]   [facility] [address] [facility] [phone] [address] [address] [facility] [address] [facility] [address] [phone]

## 2023-06-27 NOTE — ASSESSMENT & PLAN NOTE
vanc, cefepime transitioned to Unasyn per wound cx  Blood cultures NGTD  Anaerobic cx pending  Surgery following-  I+D 6/26  Pain control     Will need HHC for packing assistance

## 2023-06-27 NOTE — PLAN OF CARE
Problem: METABOLIC, FLUID AND ELECTROLYTES - ADULT  Goal: Electrolytes maintained within normal limits  Description: INTERVENTIONS:  - Monitor labs and assess patient for signs and symptoms of electrolyte imbalances  - Administer electrolyte replacement as ordered  - Monitor response to electrolyte replacements, including repeat lab results as appropriate  - Instruct patient on fluid and nutrition as appropriate  Outcome: Progressing  Goal: Fluid balance maintained  Description: INTERVENTIONS:  - Monitor labs   - Monitor I/O and WT  - Instruct patient on fluid and nutrition as appropriate  - Assess for signs & symptoms of volume excess or deficit  Outcome: Progressing  Goal: Glucose maintained within target range  Description: INTERVENTIONS:  - Monitor Blood Glucose as ordered  - Assess for signs and symptoms of hyperglycemia and hypoglycemia  - Administer ordered medications to maintain glucose within target range  - Assess nutritional intake and initiate nutrition service referral as needed  Outcome: Progressing     Problem: SKIN/TISSUE INTEGRITY - ADULT  Goal: Skin Integrity remains intact(Skin Breakdown Prevention)  Description: Assess:  -Perform Girish assessment every shift  -Clean and moisturize skin every day  -Inspect skin when repositioning, toileting, and assisting with ADLS  -Assess under medical devices such as Masimo every hour  -Assess extremities for adequate circulation and sensation     Bed Management:  -Have minimal linens on bed & keep smooth, unwrinkled  -Change linens as needed when moist or perspiring  -Avoid sitting or lying in one position for more than 2 hours while in bed  -Keep HOB at 30 degrees     Toileting:  -Offer bedside commode    Activity:  -Mobilize patient 3 times a day  -Encourage activity and walks on unit  -Encourage or provide ROM exercises   -Turn and reposition patient every 2 Hours  -Use appropriate equipment to lift or move patient in bed  -Instruct/ Assist with weight shifting when out of bed in chair  -Consider limitation of chair time 3 hour intervals    Skin Care:  -Avoid use of baby powder, tape, friction and shearing, hot water or constrictive clothing  -Relieve pressure over bony prominences using pillows/foam wedges  -Do not massage red bony areas    Next Steps:  -Teach patient strategies to minimize risks such as repositioning, ambulation, proper nutrition   -Consider consults to  interdisciplinary teams such as Wound Care  Outcome: Progressing  Goal: Incision(s), wounds(s) or drain site(s) healing without S/S of infection  Description: INTERVENTIONS  - Assess and document dressing, incision, wound bed, drain sites and surrounding tissue  - Provide patient and family education  - Perform skin care/dressing changes per orders and as needed for soilage  Outcome: Progressing  Goal: Pressure injury heals and does not worsen  Description: Interventions:  - Implement low air loss mattress or specialty surface (Criteria met)  - Apply silicone foam dressing  - Instruct/assist with weight shifting every 15 minutes when in chair   - Limit chair time to 3 hour intervals  - Use special pressure reducing interventions such as chair pad when in chair   - Consider nutrition services referral as needed  Outcome: Progressing     Problem: Prexisting or High Potential for Compromised Skin Integrity  Goal: Skin integrity is maintained or improved  Description: INTERVENTIONS:  - Identify patients at risk for skin breakdown  - Assess and monitor skin integrity  - Assess and monitor nutrition and hydration status  - Monitor labs   - Assess for incontinence   - Turn and reposition patient  - Assist with mobility/ambulation  - Relieve pressure over bony prominences  - Avoid friction and shearing  - Provide appropriate hygiene as needed including keeping skin clean and dry  - Evaluate need for skin moisturizer/barrier cream  - Collaborate with interdisciplinary team   - Patient/family teaching  - Consider wound care consult   Outcome: Progressing     Problem: PAIN - ADULT  Goal: Verbalizes/displays adequate comfort level or baseline comfort level  Description: Interventions:  - Encourage patient to monitor pain and request assistance  - Assess pain using appropriate pain scale  - Administer analgesics based on type and severity of pain and evaluate response  - Implement non-pharmacological measures as appropriate and evaluate response  - Consider cultural and social influences on pain and pain management  - Notify physician/advanced practitioner if interventions unsuccessful or patient reports new pain  Outcome: Progressing     Problem: INFECTION - ADULT  Goal: Absence or prevention of progression during hospitalization  Description: INTERVENTIONS:  - Assess and monitor for signs and symptoms of infection  - Monitor lab/diagnostic results  - Monitor all insertion sites, i.e. indwelling lines, tubes, and drains  - Monitor endotracheal if appropriate and nasal secretions for changes in amount and color  - Palouse appropriate cooling/warming therapies per order  - Administer medications as ordered  - Instruct and encourage patient and family to use good hand hygiene technique  - Identify and instruct in appropriate isolation precautions for identified infection/condition  Outcome: Progressing     Problem: DISCHARGE PLANNING  Goal: Discharge to home or other facility with appropriate resources  Description: INTERVENTIONS:  - Identify barriers to discharge w/patient and caregiver  - Arrange for needed discharge resources and transportation as appropriate  - Identify discharge learning needs (meds, wound care, etc.)  - Arrange for interpretive services to assist at discharge as needed  - Refer to Case Management Department for coordinating discharge planning if the patient needs post-hospital services based on physician/advanced practitioner order or complex needs related to functional status, cognitive ability, or social support system  Outcome: Progressing     Problem: Knowledge Deficit  Goal: Patient/family/caregiver demonstrates understanding of disease process, treatment plan, medications, and discharge instructions  Description: Complete learning assessment and assess knowledge base.   Interventions:  - Provide teaching at level of understanding  - Provide teaching via preferred learning methods  Outcome: Progressing

## 2023-06-27 NOTE — PHYSICAL THERAPY NOTE
PHYSICAL THERAPY SCREEN NOTE      Patient Name: Linda MOMIN'D Date: 6/27/2023 06/27/23 5798   PT Last Visit   PT Visit Date 06/27/23   Note Type   Note type Screen   Additional Comments PT orders received, chart review performed. Spoke to [employer] yesterday, per CM open pt has been independently ambulating around room without difficulty.  Pt w/ no acute PT needs, will DC PT       Mathews Severs, PT, DPT

## 2023-06-27 NOTE — PROGRESS NOTES
Carrie Yu is a 57 y.o. female who is currently ordered Vancomycin IV with management by the Pharmacy Consult service. Relevant clinical data and objective / subjective history reviewed. Vancomycin Assessment:  Indication and Goal AUC/Trough: Soft tissue (goal -600, trough >10)  Clinical Status: stable  Micro:     Renal Function:  SCr: 0.73 mg/dL  CrCl: 80.3 mL/min  Renal replacement: Not on dialysis  Days of Therapy: 4  Current Dose: 1000mg every 12 hours  Vancomycin Plan: level drawn on 6/27/23 instead of 7/3/23. Random level resulted 32.9. It is falsely elevated since it was drawn after ~2 hours of the dose. Will continue current dose and will draw true trough 30 minutes prior to dose noon dose tomorrow. New Dosing: No change  Estimated AUC: 520 mcg*hr/mL  Estimated Trough: 14.7 mcg/mL  Next Level: 6/28/23 at 1130  Renal Function Monitoring: Daily BMP and UOP  Pharmacy will continue to follow closely for s/sx of nephrotoxicity, infusion reactions and appropriateness of therapy. BMP and CBC will be ordered per protocol. WC is growing staph aureus preliminary and will wait for final result for antibiotics adjustment. We will continue to follow the patient’s culture results and clinical progress daily. Also, cefepime will be adjusted renally if necessary.     Mario Camarena, Pharmacist, PharmD, BCPS

## 2023-06-27 NOTE — PLAN OF CARE
Problem: METABOLIC, FLUID AND ELECTROLYTES - ADULT  Goal: Electrolytes maintained within normal limits  Description: INTERVENTIONS:  - Monitor labs and assess patient for signs and symptoms of electrolyte imbalances  - Administer electrolyte replacement as ordered  - Monitor response to electrolyte replacements, including repeat lab results as appropriate  - Instruct patient on fluid and nutrition as appropriate  Outcome: Progressing  Goal: Fluid balance maintained  Description: INTERVENTIONS:  - Monitor labs   - Monitor I/O and WT  - Instruct patient on fluid and nutrition as appropriate  - Assess for signs & symptoms of volume excess or deficit  Outcome: Progressing  Goal: Glucose maintained within target range  Description: INTERVENTIONS:  - Monitor Blood Glucose as ordered  - Assess for signs and symptoms of hyperglycemia and hypoglycemia  - Administer ordered medications to maintain glucose within target range  - Assess nutritional intake and initiate nutrition service referral as needed  Outcome: Progressing     Problem: SKIN/TISSUE INTEGRITY - ADULT  Goal: Skin Integrity remains intact(Skin Breakdown Prevention)  Description: Assess:  -Perform Girish assessment every   -Clean and moisturize skin every   -Inspect skin when repositioning, toileting, and assisting with ADLS  -Assess under medical devices   -Assess extremities for adequate circulation and sensation     Bed Management:  -Have minimal linens on bed & keep smooth, unwrinkled  -Change linens as needed when moist or perspiring  -Avoid sitting or lying in one position for more than 2 hours while in bed  -Keep HOB at 30 degrees     Toileting:  -Offer bedside commode  -Assess for incontinence every   -Use incontinent care products after each incontinent episode such as     Activity:  -Mobilize patient 2 times a day  -Encourage activity and walks on unit  -Encourage or provide ROM exercises   -Turn and reposition patient every 2 Hours  -Use appropriate equipment to lift or move patient in bed  -Instruct/ Assist with weight shifting every 2 when out of bed in chair  -Consider limitation of chair time 2 hour intervals    Skin Care:  -Avoid use of baby powder, tape, friction and shearing, hot water or constrictive clothing  -Relieve pressure over bony prominences   -Do not massage red bony areas    Next Steps:  -Teach patient strategies to minimize risks such as x   -Consider consults to  interdisciplinary teams such as x  Outcome: Progressing  Goal: Incision(s), wounds(s) or drain site(s) healing without S/S of infection  Description: INTERVENTIONS  - Assess and document dressing, incision, wound bed, drain sites and surrounding tissue  - Provide patient and family education  - Perform skin care/dressing changes every x  Outcome: Progressing  Goal: Pressure injury heals and does not worsen  Description: Interventions:  - Implement low air loss mattress or specialty surface (Criteria met)  - Apply silicone foam dressing  - Instruct/assist with weight shifting every x minutes when in chair   - Limit chair time to x hour intervals  - Use special pressure reducing interventions such as x when in chair   - Apply fecal or urinary incontinence containment device   - Perform passive or active ROM every x  - Turn and reposition patient & offload bony prominences every x hours   - Utilize friction reducing device or surface for transfers   - Consider consults to  interdisciplinary teams such as x  - Use incontinent care products after each incontinent episode such as xxx  - Consider nutrition services referral as needed  Outcome: Progressing     Problem: Prexisting or High Potential for Compromised Skin Integrity  Goal: Skin integrity is maintained or improved  Description: INTERVENTIONS:  - Identify patients at risk for skin breakdown  - Assess and monitor skin integrity  - Assess and monitor nutrition and hydration status  - Monitor labs   - Assess for incontinence   - Turn and reposition patient  - Assist with mobility/ambulation  - Relieve pressure over bony prominences  - Avoid friction and shearing  - Provide appropriate hygiene as needed including keeping skin clean and dry  - Evaluate need for skin moisturizer/barrier cream  - Collaborate with interdisciplinary team   - Patient/family teaching  - Consider wound care consult   Outcome: Progressing

## 2023-06-27 NOTE — PROGRESS NOTES
[id number]  Progress Note  Name: Araceli White  MRN: [id number]  Unit/Bed#: -01 I Date of Admission: 6/24/2023   Date of Service: 6/27/2023 I Hospital Day: 3    Assessment/Plan   * Cellulitis of buttock, right  Assessment & Plan  Cont vanc, cefepime  Blood cultures NGTD @ 48h  Await wound cultures growing staph and strept  Surgery following-  I+D today 6/26  Pain control     Other hyperlipidemia  Assessment & Plan  Cont zocor. Primary hypertension  Assessment & Plan  Cont microzide. Class 1 obesity due to excess calories with serious comorbidity and body mass index (BMI) of 34.0 to 34.9 in adult  Assessment & Plan  Contributes to all aspects of care  Weight loss encouraged           VTE Pharmacologic Prophylaxis: VTE Score: 5 High Risk (Score >/= 5) - Pharmacological DVT Prophylaxis Ordered: enoxaparin (Lovenox). Sequential Compression Devices Ordered. Patient Centered Rounds: I performed bedside rounds with nursing staff today. Discussions with Specialists or Other Care Team Provider: surgery, CM, PT, OT    Education and Discussions with Family / Patient: Patient declined call to . Total Time Spent on Date of Encounter in care of patient: 35 minutes This time was spent on one or more of the following: performing physical exam; counseling and coordination of care; obtaining or reviewing history; documenting in the medical record; reviewing/ordering tests, medications or procedures; communicating with other healthcare professionals and discussing with patient's family/caregivers. Current Length of Stay: 3 day(s)  Current Patient Status: Inpatient   Certification Statement: The patient will continue to require additional inpatient hospital stay due to cellulitis  Discharge Plan: Anticipate discharge in >72 hrs to home. Code Status: Level 1 - Full Code    Subjective:   Alanna Parker was seen and examined at bedside. No acute events overnight.   Discussed plan of care. All questions and concerns were answered and addressed. Continues to have significant pain from this site. Discussed that she will be here for a few more days while cultures result. All other symptoms on review of systems was negative. Objective:     Vitals:   Temp (24hrs), Av.2 °F (36.8 °C), Min:98.1 °F (36.7 °C), Max:98.4 °F (36.9 °C)    Temp:  [98.1 °F (36.7 °C)-98.4 °F (36.9 °C)] 98.1 °F (36.7 °C)  HR:  [75-82] 76  Resp:  [16-18] 18  BP: (123-147)/(57-81) 147/81  SpO2:  [85 %-90 %] 90 %  Body mass index is 31.53 kg/m². Input and Output Summary (last 24 hours): Intake/Output Summary (Last 24 hours) at 2023 1038  Last data filed at 2023 0844  Gross per 24 hour   Intake 777 ml   Output --   Net 777 ml       Physical Exam:   Physical Exam   Vitals and nursing note reviewed. Constitutional:       Appearance: Normal appearance. She is obese. HENT:      Head: Normocephalic and atraumatic. Cardiovascular:      Rate and Rhythm: Normal rate and regular rhythm. Pulses: Normal pulses. Heart sounds: Normal heart sounds. Pulmonary:      Effort: Pulmonary effort is normal.      Breath sounds: Normal breath sounds. Abdominal:      General: Abdomen is flat. Bowel sounds are normal.      Palpations: Abdomen is soft. Musculoskeletal:      Right lower leg: No edema. Left lower leg: No edema. Skin:     General: Skin is warm. Findings: Erythema, lesion and rash present. Neurological:      General: No focal deficit present. Mental Status: She is alert and oriented to person, place, and time.      Additional Data:     Labs:  Results from last 7 days   Lab Units 23  0258 23  0506   WBC Thousand/uL 9.42 11.83*   HEMOGLOBIN g/dL 11.9 11.1*   HEMATOCRIT % 37.1 34.3*   PLATELETS Thousands/uL 230 211   NEUTROS PCT %  --  68   LYMPHS PCT %  --  19   MONOS PCT %  --  11   EOS PCT %  --  1     Results from last 7 days   Lab Units 23  0258 06/26/23  0506   SODIUM mmol/L 137 139   POTASSIUM mmol/L 3.8 3.5   CHLORIDE mmol/L 100 101   CO2 mmol/L 31 32   BUN mg/dL 10 11   CREATININE mg/dL 0.73 0.68   ANION GAP mmol/L 6 6   CALCIUM mg/dL 8.5 8.2*   ALBUMIN g/dL  --  3.3*   TOTAL BILIRUBIN mg/dL  --  0.42   ALK PHOS U/L  --  42   ALT U/L  --  8   AST U/L  --  13   GLUCOSE RANDOM mg/dL 94 96                 Results from last 7 days   Lab Units 06/24/23  1200   LACTIC ACID mmol/L 0.7   PROCALCITONIN ng/ml <0.05       Lines/Drains:  Invasive Devices     Peripheral Intravenous Line  Duration           Peripheral IV 06/26/23 Dorsal (posterior); Right Hand 1 day    Peripheral IV 06/26/23 Dorsal (posterior); Left Hand <1 day                      Imaging: No pertinent imaging reviewed. Recent Cultures (last 7 days):   Results from last 7 days   Lab Units 06/26/23  1226 06/24/23  1210 06/24/23  1200   BLOOD CULTURE   --   --  No Growth at 48 hrs. No Growth at 48 hrs.    GRAM STAIN RESULT  4+ Polys*  3+ Gram positive cocci in clusters* No polys seen*  1+ Gram positive cocci in pairs*  --    WOUND CULTURE   --  4+ Growth of Staphylococcus aureus*  --        Last 24 Hours Medication List:   Current Facility-Administered Medications   Medication Dose Route Frequency Provider Last Rate   • acetaminophen  650 mg Oral Q6H PRN Brenda Shine MD     • aspirin  81 mg Oral Daily Mike Caldera MD     • cefepime  2,000 mg Intravenous Q12H Brenda Shine MD 2,000 mg (06/26/23 2340)   • docusate sodium  100 mg Oral BID Brenda Shine MD     • enoxaparin  40 mg Subcutaneous Daily rBenda Shine MD     • hydrochlorothiazide  12.5 mg Oral Daily Mike Caldera MD     • ketorolac  15 mg Intravenous Q6H PRN Brenda Shine MD     • ondansetron  4 mg Intravenous Q6H PRN Brenda Shine MD     • oxyCODONE  5 mg Oral Q4H PRN Tam Steele PA-C     • polyethylene glycol  17 g Oral Daily Brenda Shine MD     • pravastatin  20 mg Oral Daily With Nicola Nelson MD     • senna  1 tablet Oral Daily Medardo Zamudio MD     • sertraline  25 mg Oral Daily Medardo Zamudio MD     • vancomycin  1,000 mg Intravenous Q12H Medardo Zamudio MD 1,000 mg (06/27/23 0033)        Today, Patient Was Seen By: Karan Reynolds MD    **Please Note: This note may have been constructed using a voice recognition system. **

## 2023-06-28 LAB
ANION GAP SERPL CALCULATED.3IONS-SCNC: 6 MMOL/L
BACTERIA WND AEROBE CULT: ABNORMAL
BASOPHILS # BLD AUTO: 0.04 THOUSANDS/ÂΜL (ref 0–0.1)
BASOPHILS NFR BLD AUTO: 1 % (ref 0–1)
BUN SERPL-MCNC: 15 MG/DL (ref 5–25)
CALCIUM SERPL-MCNC: 8.6 MG/DL (ref 8.4–10.2)
CHLORIDE SERPL-SCNC: 101 MMOL/L (ref 96–108)
CO2 SERPL-SCNC: 31 MMOL/L (ref 21–32)
CREAT SERPL-MCNC: 0.68 MG/DL (ref 0.6–1.3)
EOSINOPHIL # BLD AUTO: 0.19 THOUSAND/ÂΜL (ref 0–0.61)
EOSINOPHIL NFR BLD AUTO: 3 % (ref 0–6)
ERYTHROCYTE [DISTWIDTH] IN BLOOD BY AUTOMATED COUNT: 13.3 % (ref 11.6–15.1)
GFR SERPL CREATININE-BSD FRML MDRD: 92 ML/MIN/1.73SQ M
GLUCOSE SERPL-MCNC: 95 MG/DL (ref 65–140)
GRAM STN SPEC: ABNORMAL
GRAM STN SPEC: ABNORMAL
HCT VFR BLD AUTO: 35.8 % (ref 34.8–46.1)
HGB BLD-MCNC: 11.5 G/DL (ref 11.5–15.4)
IMM GRANULOCYTES # BLD AUTO: 0.07 THOUSAND/UL (ref 0–0.2)
IMM GRANULOCYTES NFR BLD AUTO: 1 % (ref 0–2)
LYMPHOCYTES # BLD AUTO: 1.87 THOUSANDS/ÂΜL (ref 0.6–4.47)
LYMPHOCYTES NFR BLD AUTO: 26 % (ref 14–44)
MCH RBC QN AUTO: 29.1 PG (ref 26.8–34.3)
MCHC RBC AUTO-ENTMCNC: 32.1 G/DL (ref 31.4–37.4)
MCV RBC AUTO: 91 FL (ref 82–98)
MONOCYTES # BLD AUTO: 1.17 THOUSAND/ÂΜL (ref 0.17–1.22)
MONOCYTES NFR BLD AUTO: 16 % (ref 4–12)
NEUTROPHILS # BLD AUTO: 3.91 THOUSANDS/ÂΜL (ref 1.85–7.62)
NEUTS SEG NFR BLD AUTO: 53 % (ref 43–75)
NRBC BLD AUTO-RTO: 0 /100 WBCS
PLATELET # BLD AUTO: 247 THOUSANDS/UL (ref 149–390)
PMV BLD AUTO: 10.4 FL (ref 8.9–12.7)
POTASSIUM SERPL-SCNC: 3.6 MMOL/L (ref 3.5–5.3)
RBC # BLD AUTO: 3.95 MILLION/UL (ref 3.81–5.12)
SODIUM SERPL-SCNC: 138 MMOL/L (ref 135–147)
WBC # BLD AUTO: 7.25 THOUSAND/UL (ref 4.31–10.16)

## 2023-06-28 PROCEDURE — 99232 SBSQ HOSP IP/OBS MODERATE 35: CPT | Performed by: PHYSICIAN ASSISTANT

## 2023-06-28 PROCEDURE — 85025 COMPLETE CBC W/AUTO DIFF WBC: CPT | Performed by: STUDENT IN AN ORGANIZED HEALTH CARE EDUCATION/TRAINING PROGRAM

## 2023-06-28 PROCEDURE — 80048 BASIC METABOLIC PNL TOTAL CA: CPT | Performed by: STUDENT IN AN ORGANIZED HEALTH CARE EDUCATION/TRAINING PROGRAM

## 2023-06-28 PROCEDURE — 99233 SBSQ HOSP IP/OBS HIGH 50: CPT | Performed by: STUDENT IN AN ORGANIZED HEALTH CARE EDUCATION/TRAINING PROGRAM

## 2023-06-28 RX ADMIN — AMPICILLIN SODIUM AND SULBACTAM SODIUM 3 G: 2; 1 INJECTION, POWDER, FOR SOLUTION INTRAMUSCULAR; INTRAVENOUS at 13:45

## 2023-06-28 RX ADMIN — SENNOSIDES 8.6 MG: 8.6 TABLET, FILM COATED ORAL at 08:51

## 2023-06-28 RX ADMIN — DOCUSATE SODIUM 100 MG: 100 CAPSULE, LIQUID FILLED ORAL at 08:51

## 2023-06-28 RX ADMIN — PRAVASTATIN SODIUM 20 MG: 20 TABLET ORAL at 17:07

## 2023-06-28 RX ADMIN — ASPIRIN 81 MG CHEWABLE TABLET 81 MG: 81 TABLET CHEWABLE at 08:51

## 2023-06-28 RX ADMIN — SERTRALINE HYDROCHLORIDE 25 MG: 50 TABLET ORAL at 08:51

## 2023-06-28 RX ADMIN — VANCOMYCIN HYDROCHLORIDE 1000 MG: 1 INJECTION, SOLUTION INTRAVENOUS at 00:23

## 2023-06-28 RX ADMIN — HYDROCHLOROTHIAZIDE 12.5 MG: 12.5 TABLET ORAL at 08:51

## 2023-06-28 RX ADMIN — ENOXAPARIN SODIUM 40 MG: 100 INJECTION SUBCUTANEOUS at 08:51

## 2023-06-28 RX ADMIN — AMPICILLIN SODIUM AND SULBACTAM SODIUM 3 G: 2; 1 INJECTION, POWDER, FOR SOLUTION INTRAMUSCULAR; INTRAVENOUS at 20:40

## 2023-06-28 RX ADMIN — AMPICILLIN SODIUM AND SULBACTAM SODIUM 3 G: 2; 1 INJECTION, POWDER, FOR SOLUTION INTRAMUSCULAR; INTRAVENOUS at 08:47

## 2023-06-28 RX ADMIN — DOCUSATE SODIUM 100 MG: 100 CAPSULE, LIQUID FILLED ORAL at 17:07

## 2023-06-28 NOTE — PROGRESS NOTES
Vancomycin IV Pharmacy-to-Dose Consultation     Vancomycin has been discontinued. Pharmacy will sign off. Please contact or re-consult with questions.     Emy Bradley, Pharmacist

## 2023-06-28 NOTE — PROGRESS NOTES
Progress Note - General Surgery   Susan Plater 57 y.o. female MRN: [id number]  Unit/Bed#: -01 Encounter: [id number]    Assessment/Plan:  Right buttock wound/abscess  -POD#2 s/p I&D at bedside  -Cultures growing MSSA  -Leukocytosis resolved  -Wounds today with serous drainage, improving erythema and significantly improved induration  -Continue antibiotics per primary service, changed to oral for discharge  -Continue local wound care as ordered, packing changed today  -Discharge wound care orders placed  -Continue pain control as needed  -Case management to set up VNA  -Outpatient follow-up in wound care center or surgical office     HLD, HTN, Obesity  -Medical management    Subjective/Objective   Chief Complaint: pain    Subjective: Still with pain at incision site but much improved. Currently rates 2 out of 10 intensity. Continues with drainage. Dressing changed last evening. Denies any fevers or chills. No nausea or vomiting. Objective:     Blood pressure 136/70, pulse 74, temperature 98.1 °F (36.7 °C), temperature source Oral, resp. rate 16, height 5' 2.5" (1.588 m), weight 82.7 kg (182 lb 5.1 oz), SpO2 91 %. ,Body mass index is 32.82 kg/m². Intake/Output Summary (Last 24 hours) at 6/28/2023 1019  Last data filed at 6/28/2023 0847  Gross per 24 hour   Intake 875 ml   Output --   Net 875 ml       Invasive Devices     Peripheral Intravenous Line  Duration           Peripheral IV 06/26/23 Dorsal (posterior); Right Hand 2 days    Peripheral IV 06/26/23 Dorsal (posterior); Left Hand 1 day                Physical Exam:   General appearance: alert and oriented, in no acute distress  Head: Normocephalic, without obvious abnormality, atraumatic, sclerae anicteric, mucous membranes moist  Neck: no JVD and supple, symmetrical, trachea midline  Lungs: clear to auscultation, no wheezes or rales  Heart:   Regular rate and rhythm, S1-S2 normal, no murmur  Abdomen:   Flat, soft, nontender, active bowel sounds  Extremities:   No edema, redness or tenderness in the calves or thighs  Skin: Warm, dry; Buttock wound with serous drainage, improving erythema, significantly improved induration, still with tenderness  Nursing notes and vital signs reviewed      Lab, Imaging and other studies:  I have personally reviewed pertinent lab results.   , CBC:   Lab Results   Component Value Date    WBC 7.25 06/28/2023    HGB 11.5 06/28/2023    HCT 35.8 06/28/2023    MCV 91 06/28/2023     06/28/2023    RBC 3.95 06/28/2023    MCH 29.1 06/28/2023    MCHC 32.1 06/28/2023    RDW 13.3 06/28/2023    MPV 10.4 06/28/2023    NRBC 0 06/28/2023   , CMP:   Lab Results   Component Value Date    SODIUM 138 06/28/2023    K 3.6 06/28/2023     06/28/2023    CO2 31 06/28/2023    BUN 15 06/28/2023    CREATININE 0.68 06/28/2023    CALCIUM 8.6 06/28/2023    EGFR 92 06/28/2023     VTE Pharmacologic Prophylaxis: Heparin  VTE Mechanical Prophylaxis: sequential compression device     Dickson Steele

## 2023-06-28 NOTE — PROGRESS NOTES
[facility]  Progress Note  Name: Kitty Majano  MRN: [id number]  Unit/Bed#: -01 I Date of Admission: 6/24/2023   Date of Service: 6/28/2023 I Hospital Day: 4    Assessment/Plan   * Cellulitis of buttock, right  Assessment & Plan  vanc, cefepime transitioned to Unasyn per wound cx  Blood cultures NGTD  Anaerobic cx pending  Surgery following-  I+D 6/26  Pain control     Will need [facility] for packing assistance    Other hyperlipidemia  Assessment & Plan  Cont zocor. Primary hypertension  Assessment & Plan  Cont microzide. Class 1 obesity due to excess calories with serious comorbidity and body mass index (BMI) of 34.0 to 34.9 in adult  Assessment & Plan  Contributes to all aspects of care  Weight loss encouraged             VTE Pharmacologic Prophylaxis: VTE Score: 5 High Risk (Score >/= 5) - Pharmacological DVT Prophylaxis Ordered: enoxaparin (Lovenox). Sequential Compression Devices Ordered. Patient Centered Rounds: I performed bedside rounds with nursing staff today. Discussions with Specialists or Other Care Team Provider: surgery, CM, PT, OT    Education and Discussions with Family / Patient: Patient declined call to . Total Time Spent on Date of Encounter in care of patient: 35 minutes This time was spent on one or more of the following: performing physical exam; counseling and coordination of care; obtaining or reviewing history; documenting in the medical record; reviewing/ordering tests, medications or procedures; communicating with other healthcare professionals and discussing with patient's family/caregivers. Current Length of Stay: 4 day(s)  Current Patient Status: Inpatient   Certification Statement: The patient will continue to require additional inpatient hospital stay due to cellulitis  Discharge Plan: Anticipate discharge tomorrow to home with home services.     Code Status: Level 1 - Full Code    Subjective:   Quita Fletcher was seen and examined at bedside. No acute events overnight. Discussed plan of care. All questions and concerns were answered and addressed. States that pain has significantly improved today describing it as a 3 out of 10. All other symptoms on review of systems was negative. Objective:     Vitals:   Temp (24hrs), Av.1 °F (36.7 °C), Min:98 °F (36.7 °C), Max:98.1 °F (36.7 °C)    Temp:  [98 °F (36.7 °C)-98.1 °F (36.7 °C)] 98 °F (36.7 °C)  HR:  [76-84] 84  Resp:  [18-19] 19  BP: (130-147)/(65-81) 132/66  SpO2:  [90 %-94 %] 94 %  Body mass index is 32.82 kg/m². Input and Output Summary (last 24 hours): Intake/Output Summary (Last 24 hours) at 2023 0700  Last data filed at 2023 1237  Gross per 24 hour   Intake 932 ml   Output --   Net 932 ml       Physical Exam:   Physical Exam   Vitals and nursing note reviewed. Constitutional:       Appearance: Normal appearance. She is obese. HENT:      Head: Normocephalic and atraumatic. Cardiovascular:      Rate and Rhythm: Normal rate and regular rhythm.      Pulses: Normal pulses.      Heart sounds: Normal heart sounds. Pulmonary:      Effort: Pulmonary effort is normal.      Breath sounds: Normal breath sounds. Abdominal:      General: Abdomen is flat. Bowel sounds are normal.      Palpations: Abdomen is soft. Musculoskeletal:      Right lower leg: No edema.      Left lower leg: No edema.    Skin:     General: Skin is warm.      Findings: Erythema, lesion and rash improving   Neurological:      General: No focal deficit present.      Mental Status: She is alert and oriented to person, place, and time.        Additional Data:     Labs:  Results from last 7 days   Lab Units 23  0353   WBC Thousand/uL 7.25   HEMOGLOBIN g/dL 11.5   HEMATOCRIT % 35.8   PLATELETS Thousands/uL 247   NEUTROS PCT % 53   LYMPHS PCT % 26   MONOS PCT % 16*   EOS PCT % 3     Results from last 7 days   Lab Units 23  0353 23  0258 23  0506   SODIUM mmol/L 138   < > 139 POTASSIUM mmol/L 3.6   < > 3.5   CHLORIDE mmol/L 101   < > 101   CO2 mmol/L 31   < > 32   BUN mg/dL 15   < > 11   CREATININE mg/dL 0.68   < > 0.68   ANION GAP mmol/L 6   < > 6   CALCIUM mg/dL 8.6   < > 8.2*   ALBUMIN g/dL  --   --  3.3*   TOTAL BILIRUBIN mg/dL  --   --  0.42   ALK PHOS U/L  --   --  42   ALT U/L  --   --  8   AST U/L  --   --  13   GLUCOSE RANDOM mg/dL 95   < > 96    < > = values in this interval not displayed. Results from last 7 days   Lab Units 06/24/23  1200   LACTIC ACID mmol/L 0.7   PROCALCITONIN ng/ml <0.05       Lines/Drains:  Invasive Devices     Peripheral Intravenous Line  Duration           Peripheral IV 06/26/23 Dorsal (posterior); Right Hand 2 days    Peripheral IV 06/26/23 Dorsal (posterior); Left Hand 1 day                      Imaging: No pertinent imaging reviewed. Recent Cultures (last 7 days):   Results from last 7 days   Lab Units 06/26/23  1226 06/24/23  1210 06/24/23  1200   BLOOD CULTURE   --   --  No Growth at 72 hrs. No Growth at 72 hrs.    GRAM STAIN RESULT  4+ Polys*  3+ Gram positive cocci in clusters* No polys seen*  1+ Gram positive cocci in pairs*  --    WOUND CULTURE  4+ Growth of Staphylococcus aureus* 4+ Growth of Staphylococcus aureus*  --        Last 24 Hours Medication List:   Current Facility-Administered Medications   Medication Dose Route Frequency Provider Last Rate   • acetaminophen  650 mg Oral Q6H PRN Blaise Mathews MD     • ampicillin-sulbactam  3 g Intravenous Q6H Nia Greenfield MD     • aspirin  81 mg Oral Daily Blaise Mathews MD     • docusate sodium  100 mg Oral BID Blaise Mathews MD     • enoxaparin  40 mg Subcutaneous Daily Blaise Mathews MD     • hydrochlorothiazide  12.5 mg Oral Daily Blaise Mathews MD     • ondansetron  4 mg Intravenous Q6H PRN Blaise Mathews MD     • oxyCODONE  5 mg Oral Q6H PRN Maryls Rudd PA-C     • polyethylene glycol  17 g Oral Daily Mike Smith Garima Castellon MD     • pravastatin  20 mg Oral Daily With Katey Bradley MD     • senna  1 tablet Oral Daily Yann Rios MD     • sertraline  25 mg Oral Daily Yann Rios MD          Today, Patient Was Seen By: Wesley Henderson MD    **Please Note: This note may have been constructed using a voice recognition system. **

## 2023-06-28 NOTE — PLAN OF CARE
Problem: METABOLIC, FLUID AND ELECTROLYTES - ADULT  Goal: Electrolytes maintained within normal limits  Description: INTERVENTIONS:  - Monitor labs and assess patient for signs and symptoms of electrolyte imbalances  - Administer electrolyte replacement as ordered  - Monitor response to electrolyte replacements, including repeat lab results as appropriate  - Instruct patient on fluid and nutrition as appropriate  Outcome: Progressing  Goal: Fluid balance maintained  Description: INTERVENTIONS:  - Monitor labs   - Monitor I/O and WT  - Instruct patient on fluid and nutrition as appropriate  - Assess for signs & symptoms of volume excess or deficit  Outcome: Progressing  Goal: Glucose maintained within target range  Description: INTERVENTIONS:  - Monitor Blood Glucose as ordered  - Assess for signs and symptoms of hyperglycemia and hypoglycemia  - Administer ordered medications to maintain glucose within target range  - Assess nutritional intake and initiate nutrition service referral as needed  Outcome: Progressing     Problem: SKIN/TISSUE INTEGRITY - ADULT  Goal: Skin Integrity remains intact(Skin Breakdown Prevention)  Description: Assess:  -Perform Girish assessment every x  -Clean and moisturize skin every x  -Inspect skin when repositioning, toileting, and assisting with ADLS  -Assess under medical devices such as x every x  -Assess extremities for adequate circulation and sensation     Bed Management:  -Have minimal linens on bed & keep smooth, unwrinkled  -Change linens as needed when moist or perspiring  -Avoid sitting or lying in one position for more than x hours while in bed  -[facility] at [facility]     Toileting:  -Offer bedside commode  -Assess for incontinence every x  -Use incontinent care products after each incontinent episode such as x    Activity:  -Mobilize patient x times a day  -Encourage activity and walks on unit  -Encourage or provide ROM exercises   -Turn and reposition patient every x Hours  -Use appropriate equipment to lift or move patient in bed  -Instruct/ Assist with weight shifting every x when out of bed in chair  -Consider limitation of chair time x hour intervals    Skin Care:  -Avoid use of baby powder, tape, friction and shearing, hot water or constrictive clothing  -Relieve pressure over bony prominences using x  -Do not massage red bony areas    Next Steps:  -Teach patient strategies to minimize risks such as x   -Consider consults to  interdisciplinary teams such as x  Outcome: Progressing  Goal: Incision(s), wounds(s) or drain site(s) healing without S/S of infection  Description: INTERVENTIONS  - Assess and document dressing, incision, wound bed, drain sites and surrounding tissue  - Provide patient and family education  - Perform skin care/dressing changes every x  Outcome: Progressing  Goal: Pressure injury heals and does not worsen  Description: Interventions:  - Implement low air loss mattress or specialty surface (Criteria met)  - Apply silicone foam dressing  - Instruct/assist with weight shifting every x minutes when in chair   - Limit chair time to x hour intervals  - Use special pressure reducing interventions such as x when in chair   - Apply fecal or urinary incontinence containment device   - Perform passive or active ROM every x  - Turn and reposition patient & offload bony prominences every x hours   - Utilize friction reducing device or surface for transfers   - Consider consults to  interdisciplinary teams such as x  - Use incontinent care products after each incontinent episode such as xxx  - Consider nutrition services referral as needed  Outcome: Progressing     Problem: Prexisting or High Potential for Compromised Skin Integrity  Goal: Skin integrity is maintained or improved  Description: INTERVENTIONS:  - Identify patients at risk for skin breakdown  - Assess and monitor skin integrity  - Assess and monitor nutrition and hydration status  - Monitor labs   - Assess for incontinence   - Turn and reposition patient  - Assist with mobility/ambulation  - Relieve pressure over bony prominences  - Avoid friction and shearing  - Provide appropriate hygiene as needed including keeping skin clean and dry  - Evaluate need for skin moisturizer/barrier cream  - Collaborate with interdisciplinary team   - Patient/family teaching  - Consider wound care consult   Outcome: Progressing     Problem: PAIN - ADULT  Goal: Verbalizes/displays adequate comfort level or baseline comfort level  Description: Interventions:  - Encourage patient to monitor pain and request assistance  - Assess pain using appropriate pain scale  - Administer analgesics based on type and severity of pain and evaluate response  - Implement non-pharmacological measures as appropriate and evaluate response  - Consider cultural and social influences on pain and pain management  - Notify physician/advanced practitioner if interventions unsuccessful or patient reports new pain  Outcome: Progressing     Problem: INFECTION - ADULT  Goal: Absence or prevention of progression during hospitalization  Description: INTERVENTIONS:  - Assess and monitor for signs and symptoms of infection  - Monitor lab/diagnostic results  - Monitor all insertion sites, i.e. indwelling lines, tubes, and drains  - Monitor endotracheal if appropriate and nasal secretions for changes in amount and color  - [place] appropriate cooling/warming therapies per order  - Administer medications as ordered  - Instruct and encourage patient and family to use good hand hygiene technique  - Identify and instruct in appropriate isolation precautions for identified infection/condition  Outcome: Progressing     Problem: DISCHARGE PLANNING  Goal: Discharge to home or other facility with appropriate resources  Description: INTERVENTIONS:  - Identify barriers to discharge w/patient and caregiver  - Arrange for needed discharge resources and transportation as appropriate  - Identify discharge learning needs (meds, wound care, etc.)  - Arrange for interpretive services to assist at discharge as needed  - Refer to Case Management Department for coordinating discharge planning if the patient needs post-hospital services based on physician/advanced practitioner order or complex needs related to functional status, cognitive ability, or social support system  Outcome: Progressing     Problem: Knowledge Deficit  Goal: Patient/family/caregiver demonstrates understanding of disease process, treatment plan, medications, and discharge instructions  Description: Complete learning assessment and assess knowledge base.   Interventions:  - Provide teaching at level of understanding  - Provide teaching via preferred learning methods  Outcome: Progressing

## 2023-06-28 NOTE — ASSESSMENT & PLAN NOTE
Unasyn  Blood cultures NGTD  Anaerobic cx pending  Surgery following-  I+D 6/26  Pain control     Will be discharged with Augmentin flagyl for 7 days and florastor for 10 days

## 2023-06-28 NOTE — CASE MANAGEMENT
Case Management Discharge Planning Note    Patient name Rico Sanders  Location /-57 MRN [id number]  : 1960 Date 2023       Current Admission Date: 2023  Current Admission Diagnosis:Cellulitis of buttock, right   Patient Active Problem List    Diagnosis Date Noted   • Class 1 obesity due to excess calories with serious comorbidity and body mass index (BMI) of 34.0 to 34.9 in adult 2023   • Cellulitis of buttock, right 2023   • Primary hypertension 2023   • Other hyperlipidemia 2023      LOS (days): 4  Geometric Mean LOS (GMLOS) (days):   Days to GMLOS:     OBJECTIVE:  Risk of Unplanned Readmission Score: 8.45     Current admission status: Inpatient   Preferred Pharmacy:   [facility] #[id number], [address] [place] [address]  Phone: [phone] Fax: [phone]    Primary Care Provider: Sindy Stubbs DO    Primary Insurance: Devi Wong  Secondary Insurance:     DISCHARGE DETAILS:  [address]          Is the patient interested in [address] at discharge?: Yes  [facility] requested[de-identified] [facility] Name[de-identified] [facility] External Referral Reason (only applicable if external [facility] name selected): Patient has established relationship with provider  [facility] Provider[de-identified] PCP  Home Health Services Needed[de-identified] Evaluate Functional Status and Safety, Wound/Ostomy Care  Homebound Criteria Met[de-identified] Requires the Assistance of Another Person for Safe Ambulation or to Leave the Home  Supporting Clincal Findings[de-identified] Limited Endurance    Additional Comments: Met with Pt re: JULIO upon discharge. Pt choosing [address] as she has had [address] in past. Referral sent to  [address] via [address] [place] [address].

## 2023-06-28 NOTE — DISCHARGE SUMMARY
[facility]  Discharge- Raymond Day 1960, 57 y.o. female MRN: [id number]  Unit/Bed#: -Patricia Encounter: [id number]  Primary Care Provider: Shahnaz Byrne DO   Date and time admitted to hospital: 6/24/2023 11:29 AM    * Cellulitis of buttock, right  Assessment & Plan  Unasyn  Blood cultures NGTD  Anaerobic cx pending  Surgery following-  I+D 6/26  Pain control     Will be discharged with Augmentin flagyl for 7 days and florastor for 10 days    Other hyperlipidemia  Assessment & Plan  Cont zocor. Primary hypertension  Assessment & Plan  Cont microzide. Class 1 obesity due to excess calories with serious comorbidity and body mass index (BMI) of 34.0 to 34.9 in adult  Assessment & Plan  Contributes to all aspects of care  Weight loss encouraged    Medical Problems     Resolved Problems  Date Reviewed: 6/25/2023   None       Discharging Physician / Practitioner: Mitzi Eng MD  PCP: Shahnaz Byrne DO  Admission Date:   Admission Orders (From admission, onward)     Ordered        06/24/23 1406  [address]  Once                      Discharge Date: 06/29/23    Consultations During Hospital Stay:  · Surgery  · CM  · PT  · OT    Procedures Performed:   CT pelvis w contrast   Final Result      1. Skin thickening and infiltration in the right inferomedial buttock region. No abscess. 2.  Enlarged lymph nodes in the right lower quadrant mesentery of uncertain significance. Recommend follow-up CT abdomen pelvis in 1-2 months to assess for additional lymphadenopathy and change. Right pelvic lymph nodes as noted above probably reactive. Workstation performed: [id number]         ·   · I&D on 6/27    Significant Findings / Test Results:   · See above    Incidental Findings:   · none   ·     Test Results Pending at Discharge (will require follow up):    · Anaerobic cultures     Outpatient Tests Requested:  · none    Complications:  None known at this time    Reason for Admission: cellulitis    Hospital Course:   Kelley Rizo is a 57 y.o. female patient who originally presented to the hospital on 6/24/2023 due to cellulitis/abscess. Was initially seen in the urgent care and was referred to the ED. In the ED blood cultures and wound cultures were done. Surgery was consulted for possible I&D. She was started on broad-spectrum antibiotics. Initially there was no abscess formation and I & D was deferred. Patient continued to have significant pain. Surgery then performed an I&D on 6/27/2023 and cultures were sent out. Patient was growing MSSA. Anaerobic cultures are still pending however may be positive. She was transitioned to Unasyn. Pain has improved. She is otherwise remained hemodynamically stable afebrile in no acute respiratory distress. She has been medically cleared for discharge. She is to follow-up with her PCP and wound care. She will be discharged with 7 days of Augmentin and Flagyl. She will be discharged with 10 days of Florastor. She will have home health care to assist with packing and wound care while at home. Please see above list of diagnoses and related plan for additional information. Condition at Discharge: stable    Discharge Day Visit / Exam:   Subjective:  Lalo Nieves was seen and examined at bedside. No acute events overnight. Discussed plan of care. All questions and concerns were answered and addressed. States that she is improved. Is eager to be discharged today. Vitals: Blood Pressure: 148/88 (06/29/23 0721)  Pulse: 81 (06/29/23 0721)  Temperature: 98 °F (36.7 °C) (06/29/23 0721)  Temp Source: Oral (06/28/23 0707)  Respirations: 12 (06/29/23 0721)  Height: 5' 2.5" (158.8 cm) (06/24/23 2002)  Weight - Scale: 78.2 kg (172 lb 8 oz) (yesterday bed scale was used stated by pt. actual wt by standing scale today) (06/29/23 0357)  SpO2: 92 % (06/29/23 0900)  Exam:   Physical Exam   Vitals and nursing note reviewed. Constitutional:       Appearance: Normal appearance. She is obese. HENT:      Head: Normocephalic and atraumatic. Cardiovascular:      Rate and Rhythm: Normal rate and regular rhythm.      Pulses: Normal pulses.      Heart sounds: Normal heart sounds. Pulmonary:      Effort: Pulmonary effort is normal.      Breath sounds: Normal breath sounds. Abdominal:      General: Abdomen is flat. Bowel sounds are normal.      Palpations: Abdomen is soft. Musculoskeletal:      Right lower leg: No edema.      Left lower leg: No edema. Skin:     General: Skin is warm.      Findings: Erythema, lesion and rash improving   Neurological:      General: No focal deficit present.      Mental Status: She is alert and oriented to person, place, and time.     Discussion with Family: Patient declined call to . Discharge instructions/Information to patient and family:   See after visit summary for information provided to patient and family. Provisions for Follow-Up Care:  See after visit summary for information related to follow-up care and any pertinent home health orders. Disposition:   Home with VNA Services (Reminder: Complete face to face encounter)    Planned Readmission: no     Discharge Statement:  I spent 35 minutes discharging the patient. This time was spent on the day of discharge. I had direct contact with the patient on the day of discharge. Greater than 50% of the total time was spent examining patient, answering all patient questions, arranging and discussing plan of care with patient as well as directly providing post-discharge instructions. Additional time then spent on discharge activities. Discharge Medications:  See after visit summary for reconciled discharge medications provided to patient and/or family.       **Please Note: This note may have been constructed using a voice recognition system**

## 2023-06-28 NOTE — PLAN OF CARE
Problem: METABOLIC, FLUID AND ELECTROLYTES - ADULT  Goal: Electrolytes maintained within normal limits  Description: INTERVENTIONS:  - Monitor labs and assess patient for signs and symptoms of electrolyte imbalances  - Administer electrolyte replacement as ordered  - Monitor response to electrolyte replacements, including repeat lab results as appropriate  - Instruct patient on fluid and nutrition as appropriate  Outcome: Progressing  Goal: Fluid balance maintained  Description: INTERVENTIONS:  - Monitor labs   - Monitor I/O and WT  - Instruct patient on fluid and nutrition as appropriate  - Assess for signs & symptoms of volume excess or deficit  Outcome: Progressing  Goal: Glucose maintained within target range  Description: INTERVENTIONS:  - Monitor Blood Glucose as ordered  - Assess for signs and symptoms of hyperglycemia and hypoglycemia  - Administer ordered medications to maintain glucose within target range  - Assess nutritional intake and initiate nutrition service referral as needed  Outcome: Progressing     Problem: SKIN/TISSUE INTEGRITY - ADULT  Goal: Skin Integrity remains intact(Skin Breakdown Prevention)  Description: Assess:  -Perform Girish assessment every shift  -Clean and moisturize skin every day  -Inspect skin when repositioning, toileting, and assisting with ADLS  -Assess under medical devices such as [place] every hour  -Assess extremities for adequate circulation and sensation     Bed Management:  -Have minimal linens on bed & keep smooth, unwrinkled  -Change linens as needed when moist or perspiring  -Avoid sitting or lying in one position for more than 2 hours while in bed  -Keep HOB at 30 degrees     Toileting:  -Offer bedside commode    Activity:  -Mobilize patient 3 times a day  -Encourage activity and walks on unit  -Encourage or provide ROM exercises   -Use appropriate equipment to lift or move patient in bed  -Instruct/ Assist with weight shifting every hour when out of bed in chair  -Consider limitation of chair time 3 hour intervals    Skin Care:  -Avoid use of baby powder, tape, friction and shearing, hot water or constrictive clothing  -Relieve pressure over bony prominences using pillows/foam wedges  -Do not massage red bony areas    Next Steps:  -Teach patient strategies to minimize risks such as repositioning, ambulation   -Consider consults to  interdisciplinary teams such as wound care  Outcome: Progressing  Goal: Incision(s), wounds(s) or drain site(s) healing without S/S of infection  Description: INTERVENTIONS  - Assess and document dressing, incision, wound bed, drain sites and surrounding tissue  - Provide patient and family education  - Perform skin care/dressing changes per wound care orders and as needed for soilage  Outcome: Progressing  Goal: Pressure injury heals and does not worsen  Description: Interventions:  - Implement low air loss mattress or specialty surface (Criteria met)  - Apply silicone foam dressing  - Instruct/assist with weight shifting every 30 minutes when in chair   - Use special pressure reducing interventions such as chair pad when in chair   - Apply fecal or urinary incontinence containment device   - Utilize friction reducing device or surface for transfers   - Consider nutrition services referral as needed  Outcome: Progressing     Problem: Prexisting or High Potential for Compromised Skin Integrity  Goal: Skin integrity is maintained or improved  Description: INTERVENTIONS:  - Identify patients at risk for skin breakdown  - Assess and monitor skin integrity  - Assess and monitor nutrition and hydration status  - Monitor labs   - Assess for incontinence   - Turn and reposition patient  - Assist with mobility/ambulation  - Relieve pressure over bony prominences  - Avoid friction and shearing  - Provide appropriate hygiene as needed including keeping skin clean and dry  - Evaluate need for skin moisturizer/barrier cream  - Collaborate with interdisciplinary team   - Patient/family teaching  - Consider wound care consult   Outcome: Progressing     Problem: PAIN - ADULT  Goal: Verbalizes/displays adequate comfort level or baseline comfort level  Description: Interventions:  - Encourage patient to monitor pain and request assistance  - Assess pain using appropriate pain scale  - Administer analgesics based on type and severity of pain and evaluate response  - Implement non-pharmacological measures as appropriate and evaluate response  - Consider cultural and social influences on pain and pain management  - Notify physician/advanced practitioner if interventions unsuccessful or patient reports new pain  Outcome: Progressing     Problem: INFECTION - ADULT  Goal: Absence or prevention of progression during hospitalization  Description: INTERVENTIONS:  - Assess and monitor for signs and symptoms of infection  - Monitor lab/diagnostic results  - Monitor all insertion sites, i.e. indwelling lines, tubes, and drains  - Monitor endotracheal if appropriate and nasal secretions for changes in amount and color  - [place] appropriate cooling/warming therapies per order  - Administer medications as ordered  - Instruct and encourage patient and family to use good hand hygiene technique  - Identify and instruct in appropriate isolation precautions for identified infection/condition  Outcome: Progressing     Problem: DISCHARGE PLANNING  Goal: Discharge to home or other facility with appropriate resources  Description: INTERVENTIONS:  - Identify barriers to discharge w/patient and caregiver  - Arrange for needed discharge resources and transportation as appropriate  - Identify discharge learning needs (meds, wound care, etc.)  - Arrange for interpretive services to assist at discharge as needed  - Refer to Case Management Department for coordinating discharge planning if the patient needs post-hospital services based on physician/advanced practitioner order or complex needs related to functional status, cognitive ability, or social support system  Outcome: Progressing     Problem: Knowledge Deficit  Goal: Patient/family/caregiver demonstrates understanding of disease process, treatment plan, medications, and discharge instructions  Description: Complete learning assessment and assess knowledge base.   Interventions:  - Provide teaching at level of understanding  - Provide teaching via preferred learning methods  Outcome: Progressing

## 2023-06-29 VITALS
RESPIRATION RATE: 12 BRPM | OXYGEN SATURATION: 92 % | DIASTOLIC BLOOD PRESSURE: 88 MMHG | TEMPERATURE: 98 F | HEIGHT: 63 IN | WEIGHT: 172.5 LBS | SYSTOLIC BLOOD PRESSURE: 148 MMHG | BODY MASS INDEX: 30.56 KG/M2 | HEART RATE: 81 BPM

## 2023-06-29 LAB
ANION GAP SERPL CALCULATED.3IONS-SCNC: 7 MMOL/L
BACTERIA BLD CULT: NORMAL
BACTERIA BLD CULT: NORMAL
BACTERIA SPEC ANAEROBE CULT: NORMAL
BUN SERPL-MCNC: 16 MG/DL (ref 5–25)
CALCIUM SERPL-MCNC: 8.7 MG/DL (ref 8.4–10.2)
CHLORIDE SERPL-SCNC: 103 MMOL/L (ref 96–108)
CO2 SERPL-SCNC: 29 MMOL/L (ref 21–32)
CREAT SERPL-MCNC: 0.66 MG/DL (ref 0.6–1.3)
ERYTHROCYTE [DISTWIDTH] IN BLOOD BY AUTOMATED COUNT: 13.3 % (ref 11.6–15.1)
GFR SERPL CREATININE-BSD FRML MDRD: 93 ML/MIN/1.73SQ M
GLUCOSE SERPL-MCNC: 96 MG/DL (ref 65–140)
HCT VFR BLD AUTO: 37 % (ref 34.8–46.1)
HGB BLD-MCNC: 12 G/DL (ref 11.5–15.4)
MCH RBC QN AUTO: 29.4 PG (ref 26.8–34.3)
MCHC RBC AUTO-ENTMCNC: 32.4 G/DL (ref 31.4–37.4)
MCV RBC AUTO: 91 FL (ref 82–98)
PLATELET # BLD AUTO: 255 THOUSANDS/UL (ref 149–390)
PMV BLD AUTO: 9.8 FL (ref 8.9–12.7)
POTASSIUM SERPL-SCNC: 3.7 MMOL/L (ref 3.5–5.3)
RBC # BLD AUTO: 4.08 MILLION/UL (ref 3.81–5.12)
SODIUM SERPL-SCNC: 139 MMOL/L (ref 135–147)
WBC # BLD AUTO: 7.82 THOUSAND/UL (ref 4.31–10.16)

## 2023-06-29 PROCEDURE — 99239 HOSP IP/OBS DSCHRG MGMT >30: CPT | Performed by: STUDENT IN AN ORGANIZED HEALTH CARE EDUCATION/TRAINING PROGRAM

## 2023-06-29 PROCEDURE — 85027 COMPLETE CBC AUTOMATED: CPT | Performed by: STUDENT IN AN ORGANIZED HEALTH CARE EDUCATION/TRAINING PROGRAM

## 2023-06-29 PROCEDURE — 80048 BASIC METABOLIC PNL TOTAL CA: CPT | Performed by: STUDENT IN AN ORGANIZED HEALTH CARE EDUCATION/TRAINING PROGRAM

## 2023-06-29 RX ORDER — AMOXICILLIN AND CLAVULANATE POTASSIUM 875; 125 MG/1; MG/1
1 TABLET, FILM COATED ORAL EVERY 12 HOURS SCHEDULED
Qty: 14 TABLET | Refills: 0 | Status: SHIPPED | OUTPATIENT
Start: 2023-06-29 | End: 2023-07-06

## 2023-06-29 RX ORDER — OXYCODONE HYDROCHLORIDE 5 MG/1
5 TABLET ORAL EVERY 6 HOURS PRN
Qty: 5 TABLET | Refills: 0 | Status: SHIPPED | OUTPATIENT
Start: 2023-06-29

## 2023-06-29 RX ORDER — SACCHAROMYCES BOULARDII 250 MG
250 CAPSULE ORAL 2 TIMES DAILY
Qty: 20 CAPSULE | Refills: 0 | Status: SHIPPED | OUTPATIENT
Start: 2023-06-29 | End: 2023-07-09

## 2023-06-29 RX ORDER — METRONIDAZOLE 500 MG/1
500 TABLET ORAL EVERY 8 HOURS SCHEDULED
Qty: 21 TABLET | Refills: 0 | Status: SHIPPED | OUTPATIENT
Start: 2023-06-29 | End: 2023-07-06

## 2023-06-29 RX ADMIN — DOCUSATE SODIUM 100 MG: 100 CAPSULE, LIQUID FILLED ORAL at 08:21

## 2023-06-29 RX ADMIN — SERTRALINE HYDROCHLORIDE 25 MG: 50 TABLET ORAL at 08:19

## 2023-06-29 RX ADMIN — ENOXAPARIN SODIUM 40 MG: 100 INJECTION SUBCUTANEOUS at 08:19

## 2023-06-29 RX ADMIN — SENNOSIDES 8.6 MG: 8.6 TABLET, FILM COATED ORAL at 08:19

## 2023-06-29 RX ADMIN — ASPIRIN 81 MG CHEWABLE TABLET 81 MG: 81 TABLET CHEWABLE at 08:19

## 2023-06-29 RX ADMIN — AMPICILLIN SODIUM AND SULBACTAM SODIUM 3 G: 2; 1 INJECTION, POWDER, FOR SOLUTION INTRAMUSCULAR; INTRAVENOUS at 08:20

## 2023-06-29 RX ADMIN — AMPICILLIN SODIUM AND SULBACTAM SODIUM 3 G: 2; 1 INJECTION, POWDER, FOR SOLUTION INTRAMUSCULAR; INTRAVENOUS at 00:53

## 2023-06-29 RX ADMIN — HYDROCHLOROTHIAZIDE 12.5 MG: 12.5 TABLET ORAL at 08:19

## 2023-06-29 NOTE — PLAN OF CARE
Problem: METABOLIC, FLUID AND ELECTROLYTES - ADULT  Goal: Electrolytes maintained within normal limits  Description: INTERVENTIONS:  - Monitor labs and assess patient for signs and symptoms of electrolyte imbalances  - Administer electrolyte replacement as ordered  - Monitor response to electrolyte replacements, including repeat lab results as appropriate  - Instruct patient on fluid and nutrition as appropriate  Outcome: Progressing  Goal: Fluid balance maintained  Description: INTERVENTIONS:  - Monitor labs   - Monitor I/O and WT  - Instruct patient on fluid and nutrition as appropriate  - Assess for signs & symptoms of volume excess or deficit  Outcome: Progressing  Goal: Glucose maintained within target range  Description: INTERVENTIONS:  - Monitor Blood Glucose as ordered  - Assess for signs and symptoms of hyperglycemia and hypoglycemia  - Administer ordered medications to maintain glucose within target range  - Assess nutritional intake and initiate nutrition service referral as needed  Outcome: Progressing     Problem: SKIN/TISSUE INTEGRITY - ADULT  Goal: Skin Integrity remains intact(Skin Breakdown Prevention)  Description: Assess:  -Perform Girish assessment every   -Clean and moisturize skin every   -Inspect skin when repositioning, toileting, and assisting with ADLS  -Assess under medical devices such as  every   -Assess extremities for adequate circulation and sensation     Bed Management:  -Have minimal linens on bed & keep smooth, unwrinkled  -Change linens as needed when moist or perspiring  -Avoid sitting or lying in one position for more than  hours while in bed  -Keep HOB at degrees     Toileting:  -Offer bedside commode  -Assess for incontinence every   -Use incontinent care products after each incontinent episode such as     Activity:  -Mobilize patient  times a day  -Encourage activity and walks on unit  -Encourage or provide ROM exercises   -Turn and reposition patient every  Hours  -Use appropriate equipment to lift or move patient in bed  -Instruct/ Assist with weight shifting every  when out of bed in chair  -Consider limitation of chair time  hour intervals    Skin Care:  -Avoid use of baby powder, tape, friction and shearing, hot water or constrictive clothing  -Relieve pressure over bony prominences using   -Do not massage red bony areas    Next Steps:  -Teach patient strategies to minimize risks such as    -Consider consults to  interdisciplinary teams such as   Outcome: Progressing  Goal: Incision(s), wounds(s) or drain site(s) healing without S/S of infection  Description: INTERVENTIONS  - Assess and document dressing, incision, wound bed, drain sites and surrounding tissue  - Provide patient and family education  - Perform skin care/dressing changes every   Outcome: Progressing  Goal: Pressure injury heals and does not worsen  Description: Interventions:  - Implement low air loss mattress or specialty surface (Criteria met)  - Apply silicone foam dressing  - Instruct/assist with weight shifting every  minutes when in chair   - Limit chair time to  hour intervals  - Use special pressure reducing interventions such as  when in chair   - Apply fecal or urinary incontinence containment device   - Perform passive or active ROM every   - Turn and reposition patient & offload bony prominences every  hours   - Utilize friction reducing device or surface for transfers   - Consider consults to  interdisciplinary teams such as   - Use incontinent care products after each incontinent episode such   - Consider nutrition services referral as needed  Outcome: Progressing     Problem: Prexisting or High Potential for Compromised Skin Integrity  Goal: Skin integrity is maintained or improved  Description: INTERVENTIONS:  - Identify patients at risk for skin breakdown  - Assess and monitor skin integrity  - Assess and monitor nutrition and hydration status  - Monitor labs   - Assess for incontinence   - Turn and reposition patient  - Assist with mobility/ambulation  - Relieve pressure over bony prominences  - Avoid friction and shearing  - Provide appropriate hygiene as needed including keeping skin clean and dry  - Evaluate need for skin moisturizer/barrier cream  - Collaborate with interdisciplinary team   - Patient/family teaching  - Consider wound care consult   Outcome: Progressing     Problem: PAIN - ADULT  Goal: Verbalizes/displays adequate comfort level or baseline comfort level  Description: Interventions:  - Encourage patient to monitor pain and request assistance  - Assess pain using appropriate pain scale  - Administer analgesics based on type and severity of pain and evaluate response  - Implement non-pharmacological measures as appropriate and evaluate response  - Consider cultural and social influences on pain and pain management  - Notify physician/advanced practitioner if interventions unsuccessful or patient reports new pain  Outcome: Progressing     Problem: INFECTION - ADULT  Goal: Absence or prevention of progression during hospitalization  Description: INTERVENTIONS:  - Assess and monitor for signs and symptoms of infection  - Monitor lab/diagnostic results  - Monitor all insertion sites, i.e. indwelling lines, tubes, and drains  - Monitor endotracheal if appropriate and nasal secretions for changes in amount and color  - [place] appropriate cooling/warming therapies per order  - Administer medications as ordered  - Instruct and encourage patient and family to use good hand hygiene technique  - Identify and instruct in appropriate isolation precautions for identified infection/condition  Outcome: Progressing     Problem: DISCHARGE PLANNING  Goal: Discharge to home or other facility with appropriate resources  Description: INTERVENTIONS:  - Identify barriers to discharge w/patient and caregiver  - Arrange for needed discharge resources and transportation as appropriate  - Identify discharge learning needs (meds, wound care, etc.)  - Arrange for interpretive services to assist at discharge as needed  - Refer to Case Management Department for coordinating discharge planning if the patient needs post-hospital services based on physician/advanced practitioner order or complex needs related to functional status, cognitive ability, or social support system  Outcome: Progressing     Problem: Knowledge Deficit  Goal: Patient/family/caregiver demonstrates understanding of disease process, treatment plan, medications, and discharge instructions  Description: Complete learning assessment and assess knowledge base.   Interventions:  - Provide teaching at level of understanding  - Provide teaching via preferred learning methods  Outcome: Progressing

## 2023-06-29 NOTE — PLAN OF CARE
Problem: METABOLIC, FLUID AND ELECTROLYTES - ADULT  Goal: Electrolytes maintained within normal limits  Description: INTERVENTIONS:  - Monitor labs and assess patient for signs and symptoms of electrolyte imbalances  - Administer electrolyte replacement as ordered  - Monitor response to electrolyte replacements, including repeat lab results as appropriate  - Instruct patient on fluid and nutrition as appropriate  Outcome: Progressing  Goal: Fluid balance maintained  Description: INTERVENTIONS:  - Monitor labs   - Monitor I/O and WT  - Instruct patient on fluid and nutrition as appropriate  - Assess for signs & symptoms of volume excess or deficit  Outcome: Progressing  Goal: Glucose maintained within target range  Description: INTERVENTIONS:  - Monitor Blood Glucose as ordered  - Assess for signs and symptoms of hyperglycemia and hypoglycemia  - Administer ordered medications to maintain glucose within target range  - Assess nutritional intake and initiate nutrition service referral as needed  Outcome: Progressing     Problem: SKIN/TISSUE INTEGRITY - ADULT  Goal: Skin Integrity remains intact(Skin Breakdown Prevention)  Description: Assess:  -Perform Girish assessment every x  -Clean and moisturize skin every x  -Inspect skin when repositioning, toileting, and assisting with ADLS  -Assess under medical devices such as x every x  -Assess extremities for adequate circulation and sensation     Bed Management:  -Have minimal linens on bed & keep smooth, unwrinkled  -Change linens as needed when moist or perspiring  -Avoid sitting or lying in one position for more than x hours while in bed  -Keep HOB at [facility]     Toileting:  -Offer bedside commode  -Assess for incontinence every x  -Use incontinent care products after each incontinent episode such as x    Activity:  -Mobilize patient x times a day  -Encourage activity and walks on unit  -Encourage or provide ROM exercises   -Turn and reposition patient every x Hours  -Use appropriate equipment to lift or move patient in bed  -Instruct/ Assist with weight shifting every x when out of bed in chair  -Consider limitation of chair time x hour intervals    Skin Care:  -Avoid use of baby powder, tape, friction and shearing, hot water or constrictive clothing  -Relieve pressure over bony prominences using x  -Do not massage red bony areas    Next Steps:  -Teach patient strategies to minimize risks such as x   -Consider consults to  interdisciplinary teams such as x  Outcome: Progressing  Goal: Incision(s), wounds(s) or drain site(s) healing without S/S of infection  Description: INTERVENTIONS  - Assess and document dressing, incision, wound bed, drain sites and surrounding tissue  - Provide patient and family education  - Perform skin care/dressing changes every x  Outcome: Progressing  Goal: Pressure injury heals and does not worsen  Description: Interventions:  - Implement low air loss mattress or specialty surface (Criteria met)  - Apply silicone foam dressing  - Instruct/assist with weight shifting every x minutes when in chair   - Limit chair time to x hour intervals  - Use special pressure reducing interventions such as x when in chair   - Apply fecal or urinary incontinence containment device   - Perform passive or active ROM every x  - Turn and reposition patient & offload bony prominences every x hours   - Utilize friction reducing device or surface for transfers   - Consider consults to  interdisciplinary teams such as x  - Use incontinent care products after each incontinent episode such as x  - Consider nutrition services referral as needed  Outcome: Progressing     Problem: Prexisting or High Potential for Compromised Skin Integrity  Goal: Skin integrity is maintained or improved  Description: INTERVENTIONS:  - Identify patients at risk for skin breakdown  - Assess and monitor skin integrity  - Assess and monitor nutrition and hydration status  - Monitor labs   - Assess for incontinence   - Turn and reposition patient  - Assist with mobility/ambulation  - Relieve pressure over bony prominences  - Avoid friction and shearing  - Provide appropriate hygiene as needed including keeping skin clean and dry  - Evaluate need for skin moisturizer/barrier cream  - Collaborate with interdisciplinary team   - Patient/family teaching  - Consider wound care consult   Outcome: Progressing     Problem: PAIN - ADULT  Goal: Verbalizes/displays adequate comfort level or baseline comfort level  Description: Interventions:  - Encourage patient to monitor pain and request assistance  - Assess pain using appropriate pain scale  - Administer analgesics based on type and severity of pain and evaluate response  - Implement non-pharmacological measures as appropriate and evaluate response  - Consider cultural and social influences on pain and pain management  - Notify physician/advanced practitioner if interventions unsuccessful or patient reports new pain  Outcome: Progressing     Problem: INFECTION - ADULT  Goal: Absence or prevention of progression during hospitalization  Description: INTERVENTIONS:  - Assess and monitor for signs and symptoms of infection  - Monitor lab/diagnostic results  - Monitor all insertion sites, i.e. indwelling lines, tubes, and drains  - Monitor endotracheal if appropriate and nasal secretions for changes in amount and color  - [place] appropriate cooling/warming therapies per order  - Administer medications as ordered  - Instruct and encourage patient and family to use good hand hygiene technique  - Identify and instruct in appropriate isolation precautions for identified infection/condition  Outcome: Progressing     Problem: DISCHARGE PLANNING  Goal: Discharge to home or other facility with appropriate resources  Description: INTERVENTIONS:  - Identify barriers to discharge w/patient and caregiver  - Arrange for needed discharge resources and transportation as appropriate  - Identify discharge learning needs (meds, wound care, etc.)  - Arrange for interpretive services to assist at discharge as needed  - Refer to Case Management Department for coordinating discharge planning if the patient needs post-hospital services based on physician/advanced practitioner order or complex needs related to functional status, cognitive ability, or social support system  Outcome: Progressing     Problem: Knowledge Deficit  Goal: Patient/family/caregiver demonstrates understanding of disease process, treatment plan, medications, and discharge instructions  Description: Complete learning assessment and assess knowledge base.   Interventions:  - Provide teaching at level of understanding  - Provide teaching via preferred learning methods  Outcome: Progressing

## 2023-06-30 NOTE — UTILIZATION REVIEW
Continued Stay Review    Date:  6/28-6/29/23                 Patient Class:  Inpatient    Level of Care:  Med Surg    HPI:62 y.o. female initially admitted on 6/24/23 for cellulitis R buttock. I&D performed at bedside 6/26/23.     6/28 General Surgery: POD #2 s/p I&D at bedside. Patient still with pain at incision site, but much improved. Wounds today with serous drainage, improving erythema and significantly improved induration. Leukocytosis resolved. Cultures growing MSSA. Continue antibiotics per primary service, change to oral for discharge. Packing changed today, continue local wound care, pain control PRN. Case Management to set up VNA. Internal Medicine: Cefepime and vancomycin transitioned to Unasyn per wound culture, anaerobic culture pending. Will need [facility] for packing assistance. Case Management: VNA upon discharge. Referral sent to [facility]. 6/29 Discharged to home with home health care.      Vital Signs:    Date/Time Temp Pulse Resp BP MAP (mmHg) SpO2 Calculated FIO2 (%) - Nasal Cannula Nasal Cannula O2 Flow Rate (L/min) O2 Device   06/29/23 0900 -- -- -- -- -- 92 % -- -- None (Room air)   06/29/23 07:21:09 98 °F (36.7 °C) 81 12 148/88 108 92 % -- -- --   06/28/23 2100 -- -- -- -- -- -- 24 1 L/min None (Room air)   06/28/23 20:38:21 98.4 °F (36.9 °C) 96 -- 146/92 110 95 % -- -- --   06/28/23 15:27:18 98.3 °F (36.8 °C) 74 16 134/69 91 95 % -- -- --   06/28/23 07:07:40 98.1 °F (36.7 °C) 74 16 136/70 92 91 % -- -- None (Room air)   06/27/23 2300 -- -- -- -- -- -- -- -- None (Room air)   06/27/23 21:07:02 98 °F (36.7 °C) 84 -- 132/66 88 94 % -- -- --   06/27/23 14:38:01 98.1 °F (36.7 °C) 79 19 130/65 87 91 % -- -- --   06/27/23 07:21:59 98.1 °F (36.7 °C) 76 18 147/81 103 90 % -- -- None (Room air)         Pertinent Labs/Diagnostic Results:     6/26/23 Incision and Drain:    Patient location:  Bedside   Location:     Type:  Abscess     Size:  3 cm     Location:  Anogenital     Anogenital location: right buttock.    Pre-procedure details:     Skin preparation:  Betadine   Anesthesia (see MAR for exact dosages):     Anesthesia method:  Local infiltration     Local anesthetic:  Lidocaine 1% w/o epi (10 ml)   Procedure details:     Complexity:  Intermediate     Needle aspiration: no       Incision types:  Elliptical     Scalpel blade:  15     Approach:  Open     Incision depth:  Subcutaneous     Wound management:  Probed and deloculated, irrigated with saline and   debrided     Drainage:  Bloody and purulent     Drainage amount:  Moderate     Wound treatment:  Packing placed   [employer]:  1/2 in iodoform gauze (50 cm)     Amount 1/2" iodoform:  50 cm   Post-procedure details:     Patient tolerance of procedure:  Tolerated well, no immediate   complications   Comments:      Underlying purulent pocket, cultures obtained   Mild bleeding improved with packing   Dry sterile dressing placed   Continue wound care as ordered         Results from last 7 days   Lab Units 06/29/23 0408 06/28/23  0353 06/27/23  0258 06/26/23  0506 06/25/23  0450   WBC Thousand/uL 7.82 7.25 9.42 11.83* 11.83*   HEMOGLOBIN g/dL 12.0 11.5 11.9 11.1* 10.8*   HEMATOCRIT % 37.0 35.8 37.1 34.3* 33.6*   PLATELETS Thousands/uL 255 247 230 211 200   NEUTROS ABS Thousands/µL  --  3.91  --  8.14* 8.57*         Results from last 7 days   Lab Units 06/29/23 0408 06/28/23  0353 06/27/23  0258 06/26/23  0506 06/25/23  0450 06/24/23  1200   SODIUM mmol/L 139 138 137 139 139 138   POTASSIUM mmol/L 3.7 3.6 3.8 3.5 3.4* 2.9*   CHLORIDE mmol/L 103 101 100 101 101 95*   CO2 mmol/L 29 31 31 32 32 38*   ANION GAP mmol/L 7 6 6 6 6 5   BUN mg/dL 16 15 10 11 11 10   CREATININE mg/dL 0.66 0.68 0.73 0.68 0.82 0.81   EGFR ml/min/1.73sq m 93 92 87 92 75 76   CALCIUM mg/dL 8.7 8.6 8.5 8.2* 8.0* 8.9   MAGNESIUM mg/dL  --   --   --   --   --  2.2     Results from last 7 days   Lab Units 06/26/23  0506 06/25/23  0450 06/24/23  1200   AST U/L 13 13 15 ALT U/L 8 10 11   ALK PHOS U/L 42 42 48   TOTAL PROTEIN g/dL 6.4 6.2* 7.1   ALBUMIN g/dL 3.3* 3.3* 3.7   TOTAL BILIRUBIN mg/dL 0.42 0.42 0.52         Results from last 7 days   Lab Units 06/29/23  0408 06/28/23  0353 06/27/23  0258 06/26/23  0506 06/25/23  0450 06/24/23  1200   GLUCOSE RANDOM mg/dL 96 95 94 96 102 103         Results from last 7 days   Lab Units 06/24/23  1200   PROCALCITONIN ng/ml <0.05     Results from last 7 days   Lab Units 06/24/23  1200   LACTIC ACID mmol/L 0.7             Results from last 7 days   Lab Units 06/26/23  1226 06/24/23  1210 06/24/23  1200   BLOOD CULTURE   --   --  No Growth After 5 Days. No Growth After 5 Days.    GRAM STAIN RESULT  4+ Polys*  3+ Gram positive cocci in clusters* No polys seen*  1+ Gram positive cocci in pairs*  --    WOUND CULTURE  4+ Growth of Staphylococcus aureus* 4+ Growth of Staphylococcus aureus*  --          Results from last 7 days   Lab Units 06/27/23  0258 06/26/23  0506   VANCOMYCIN RM ug/mL 32.9*  --    VANCOMYCIN TR ug/mL  --  8.4*       Medications:   Scheduled Medications:    Medication Dose Route Frequency   • acetaminophen  650 mg Oral Q6H PRN   • ampicillin-sulbactam  3 g Intravenous Q6H   • aspirin  81 mg Oral Daily   • docusate sodium  100 mg Oral BID   • enoxaparin  40 mg Subcutaneous Daily   • hydrochlorothiazide  12.5 mg Oral Daily   • ondansetron  4 mg Intravenous Q6H PRN   • oxyCODONE  5 mg Oral Q6H PRN   • polyethylene glycol  17 g Oral Daily   • pravastatin  20 mg Oral Daily With Dinner   • senna  1 tablet Oral Daily   • sertraline  25 mg Oral Daily       ampicillin-sulbactam (UNASYN) 3 g in sodium chloride 0.9 % 100 mL IVPB  Dose: 3 g  Freq: Every 6 hours Route: IV  Last Dose: 3 g (06/29/23 0820)  Start: 06/28/23 0800 End: 06/29/23 1511        cefepime (MAXIPIME) IVPB (premix in dextrose) 2,000 mg 50 mL  Dose: 2,000 mg  Freq: Every 12 hours Route: IV  Last Dose: 2,000 mg (06/27/23 2351)  Start: 06/25/23 0000 End: 06/28/23 7739    vancomycin (VANCOCIN) IVPB (premix in dextrose) 1,000 mg 200 mL  Dose: 1,000 mg  Freq: Every 12 hours Route: IV  Last Dose: 1,000 mg (06/28/23 0023)  Start: 06/26/23 1200 End: 06/28/23 0659        PRN Medications 06/24 06/25 06/26 06/27 06/28 06/29   acetaminophen (TYLENOL) tablet 650 mg  Dose: 650 mg  Freq: Every 6 hours PRN Route: PO  PRN Reason: mild pain  Indications of Use: FEVER,HEADACHE,MILD PAIN  Start: 06/24/23 1752 End: 06/29/23 1511     2147         1511-D/C'd      ketorolac (TORADOL) injection 15 mg  Dose: 15 mg  Freq: Every 6 hours PRN Route: IV  PRN Reason: severe pain  Start: 06/24/23 1752 End: 06/27/23 1751   Admin Instructions:   LOOK ALIKE SOUND ALIKE MED    5318      0250     6067     6391      0538     7090      1249     1751-D/C'd        ondansetron (ZOFRAN) injection 4 mg  Dose: 4 mg  Freq: Every 6 hours PRN Route: IV  PRN Reasons: nausea,vomiting  Start: 06/24/23 1752 End: 06/29/23 1511   Admin Instructions:   Push over 2 minutes. 2147         1511-D/C'd      oxyCODONE (ROXICODONE) IR tablet 5 mg  Dose: 5 mg  Freq: Every 6 hours PRN Route: PO  PRN Reason: moderate pain  Start: 06/27/23 1940 End: 06/29/23 1511   Admin Instructions:   High alert medication. LOOK ALIKE SOUND ALIKE MED         1511-D/C'd      oxyCODONE (ROXICODONE) IR tablet 5 mg  Dose: 5 mg  Freq: Every 4 hours PRN Route: PO  PRN Reason: moderate pain  Start: 06/26/23 1223 End: 06/27/23 1851   Admin Instructions:   High alert medication. LOOK ALIKE SOUND ALIKE MED       1851-D/C'd        Medications 06/24 06/25 06/26 06/27 06/28 06/29                 Network Utilization Review Department  ATTENTION: Please call with any questions or concerns to [phone] and carefully listen to the prompts so that you are directed to the right person.  All voicemails are confidential.  Sridhar Joiner all requests for admission clinical reviews, approved or denied determinations and any other requests to dedicated fax number below belonging to the [place] where the patient is receiving treatment.  List of dedicated fax numbers for the Facilities:  [place] DENIALS (Administrative/Medical Necessity) [phone] [address] (Maternity/NICU/Pediatrics) [phone]   [address] [place] [address] [phone]   [facility] [address] [facility] [phone]   [address] [phone]   [address] [address] [phone]

## 2023-06-30 NOTE — UTILIZATION REVIEW
NOTIFICATION OF ADMISSION DISCHARGE   This is a Notification of Discharge from [facility] [address] [facility]. Please be advised that this patient has been discharge from our facility. Below you will find the admission and discharge date and time including the patient’s disposition. UTILIZATION REVIEW CONTACT:  Yuri Llanos  Utilization   Network Utilization Review Department  Phone: [phone] carefully listen to the prompts. All voicemails are confidential.  Email: [email or web address]     ADMISSION INFORMATION  PRESENTATION DATE: 6/24/2023 11:29 AM  OBERVATION ADMISSION DATE:   INPATIENT ADMISSION DATE: 6/24/23  2:06 PM   DISCHARGE DATE: 6/29/2023  1:11 PM   DISPOSITION:Home with Home Health Care    IMPORTANT INFORMATION:  Send all requests for admission clinical reviews, approved or denied determinations and any other requests to dedicated fax number below belonging to the campus where the patient is receiving treatment.  List of dedicated fax numbers:  [place] DENIAL[facility] (Administrative/Medical Necessity) [phone] 2303 [facility] (Maternity/NICU/Pediatrics) [phone]   [facility] [phone]   [facility] [phone] [address] [facility] [phone]   [address] [place] [address] [phone]   [facility] [phone]   [address] [place] [address] 608 [facility] [phone] 506 [facility] [phone] 3441 [facility] [phone] [address] [facility] [address] [facility] [phone]

## 2023-07-06 ENCOUNTER — OFFICE VISIT (OUTPATIENT)
Dept: WOUND CARE | Facility: HOSPITAL | Age: 63
End: 2023-07-06
Payer: COMMERCIAL

## 2023-07-06 VITALS
RESPIRATION RATE: 16 BRPM | SYSTOLIC BLOOD PRESSURE: 140 MMHG | DIASTOLIC BLOOD PRESSURE: 92 MMHG | WEIGHT: 174 LBS | HEIGHT: 62 IN | HEART RATE: 100 BPM | BODY MASS INDEX: 32.02 KG/M2 | TEMPERATURE: 97.6 F

## 2023-07-06 DIAGNOSIS — T81.89XA NON-HEALING SURGICAL WOUND, INITIAL ENCOUNTER: Primary | ICD-10-CM

## 2023-07-06 DIAGNOSIS — E66.09 CLASS 1 OBESITY DUE TO EXCESS CALORIES WITH SERIOUS COMORBIDITY AND BODY MASS INDEX (BMI) OF 34.0 TO 34.9 IN ADULT: ICD-10-CM

## 2023-07-06 PROCEDURE — 97597 DBRDMT OPN WND 1ST 20 CM/<: CPT | Performed by: NURSE PRACTITIONER

## 2023-07-06 PROCEDURE — 99204 OFFICE O/P NEW MOD 45 MIN: CPT | Performed by: NURSE PRACTITIONER

## 2023-07-06 PROCEDURE — 99213 OFFICE O/P EST LOW 20 MIN: CPT | Performed by: NURSE PRACTITIONER

## 2023-07-06 RX ORDER — LIDOCAINE HYDROCHLORIDE 40 MG/ML
5 SOLUTION TOPICAL ONCE
Status: COMPLETED | OUTPATIENT
Start: 2023-07-06 | End: 2023-07-06

## 2023-07-06 RX ADMIN — LIDOCAINE HYDROCHLORIDE 5 ML: 40 SOLUTION TOPICAL at 08:53

## 2023-07-06 NOTE — PATIENT INSTRUCTIONS
Orders Placed This Encounter   Procedures    Wound cleansing and dressings     Wash your hands with soap and water. Remove old dressing, discard into plastic bag and place in trash. Cleanse the wound with SOAP AND WATER prior to applying a clean dressing. Do not use tissue or cotton balls. Do not scrub the wound. Pat dry using gauze. Shower YES  Apply PURACOL AG to the RIGHT BUTTOCK wound. Secure with ALLEVYN WITH GENTLE BORDER  Change dressing 3XWEEK AND PRN AS NEEDED,    RETURN IN 1 WEEK.     CONTINUE VISITING NURSES FOR WOUND CARE     Standing Status:   Future     Standing Expiration Date:   7/6/2024

## 2023-07-06 NOTE — LETTER
[place] [address]  Phone#  [phone]  Fax#  [phone]    Patient:  Carrie Yu  YOB: 1960  Phone:  [phone]  Date of Visit:  7/6/2023    Orders Placed This Encounter   Procedures   • Wound cleansing and dressings     Wash your hands with soap and water. Remove old dressing, discard into plastic bag and place in trash. Cleanse the wound with SOAP AND WATER prior to applying a clean dressing. Do not use tissue or cotton balls. Do not scrub the wound. Pat dry using gauze. Shower YES  Apply PURACOL AG to the RIGHT BUTTOCK wound. Secure with ALLEVYN WITH GENTLE BORDER  Change dressing 3XWEEK AND PRN AS NEEDED,    RETURN IN 1 WEEK.     CONTINUE VISITING NURSES FOR WOUND CARE     Standing Status:   Future     Standing Expiration Date:   7/6/2024         Electronically signed by Marylene Neas, [address] [place] [address]

## 2023-07-06 NOTE — LETTER
[place] [address]  Phone#  [phone]  Fax#  [phone]    Patient:  Chase Wallis  YOB: 1960  Phone:  [phone]  Date of Visit:  7/6/2023    Orders Placed This Encounter   Procedures   • Wound cleansing and dressings     Wash your hands with soap and water. Remove old dressing, discard into plastic bag and place in trash. Cleanse the wound with SOAP AND WATER prior to applying a clean dressing. Do not use tissue or cotton balls. Do not scrub the wound. Pat dry using gauze. Shower YES  Apply PURACOL AG to the RIGHT BUTTOCK wound. Secure with ALLEVYN WITH GENTLE BORDER  Change dressing 3XWEEK AND PRN AS NEEDED,    RETURN IN 1 WEEK.     CONTINUE VISITING NURSES FOR WOUND CARE     Standing Status:   Future     Standing Expiration Date:   7/6/2024         Electronically signed by Denita Johnson, [address] [place] [address]

## 2023-07-06 NOTE — PROGRESS NOTES
Patient ID: Jourdan Morgan is a 57 y.o. female Date of Birth 1960     Chief Complaint  Chief Complaint   Patient presents with   • New Patient Visit     Wound care       Allergies  Sulfa antibiotics    Assessment:     Diagnoses and all orders for this visit:    Non-healing surgical wound, initial encounter  -     lidocaine (XYLOCAINE) 4 % topical solution 5 mL  -     Wound cleansing and dressings; Future  -     Debridement    Class 1 obesity due to excess calories with serious comorbidity and body mass index (BMI) of 34.0 to 34.9 in adult              Debridement   Wound 06/24/23 Surgical Buttocks Right    Universal Protocol:  Consent: Written consent obtained. Consent given by: patient  Time out: Immediately prior to procedure a "time out" was called to verify the correct patient, procedure, equipment, support staff and site/side marked as required. Timeout called at: 7/6/2023 8:56 AM.  Patient identity confirmed: verbally with patient      Performed by: NP  Debridement type: selective  Pain control: lidocaine 4%  Pre-debridement measurements  Length (cm): 1.8  Width (cm): 1.8  Depth (cm): 0.5  Surface Area (cm^2): 3.24  Volume (cm^3): 1.62    Post-debridement measurements  Length (cm): 1.8  Width (cm): 1.8  Depth (cm): 0.5  Percent debrided: 100%  Surface Area (cm^2): 3.24  Area debrided (cm^2): 3.24  Volume (cm^3): 1.62  Devitalized tissue debrided: biofilm, fibrin and slough  Instrument(s) utilized: curette  Bleeding: small  Hemostasis obtained with: pressure  Procedural pain (0-10): 0  Post-procedural pain: 0   Response to treatment: procedure was tolerated well          Plan:  1) Initial visit. Patient presents with right buttocks full thickness surgical wound not showing any s/s of infection. 2) Wound debrided. 3) Will change dressing to Puracol AG and cover with Allevyn foam. Change 3x/week and as needed. 4) Okay to shower.   Counseled on importance of including protein in diet to help with wound healing. 5) Patient will follow up in 1 week. Wound 06/24/23 Surgical Buttocks Right (Active)   Wound Image Images linked (Simultaneous filing. User may not have seen previous data.) 07/06/23 0831   Wound Description Yellow;Granulation tissue 07/06/23 0831   Dorina-wound Assessment Erythema 07/06/23 0831   Wound Length (cm) 1.8 cm 07/06/23 0831   Wound Width (cm) 1.8 cm 07/06/23 0831   Wound Depth (cm) 0.5 cm 07/06/23 0831   Wound Surface Area (cm^2) 3.24 cm^2 07/06/23 0831   Wound Volume (cm^3) 1.62 cm^3 07/06/23 0831   Calculated Wound Volume (cm^3) 1.62 cm^3 07/06/23 0831   Drainage Amount Moderate 07/06/23 0831   Drainage Description Serosanguineous 07/06/23 0831   Treatments Cleansed 07/06/23 0831   Wound packed? No 07/06/23 0831   Dressing Changed Changed 07/06/23 0831   Patient Tolerance Tolerated well 07/06/23 0831   Dressing Status Intact 07/06/23 0831       Wound 06/24/23 Surgical Buttocks Right (Active)   Date First Assessed/Time First Assessed: 06/24/23 2030   Present on Original Admission: Yes  Primary Wound Type: (c) Surgical  Location: Buttocks  Wound Location Orientation: Right       Subjective:      . Patient present for initial consult of right buttocks full thickness surgical wound. Patient initially presented to [address] [place] [address] on 6/24/2023 with cellulitis of the right buttocks. At [facility] surgery performed an I&D on 6/27/2023. She was discharged home on 7 days of Augmentin and Flagyl. Per patient today is her last day of antibiotics. Patient states wound is draining a moderate amount. Patient reports her appetite has been good. Patient is not diabetic. Patient denies increasing redness, edema, fevers, chills. The following portions of the patient's history were reviewed and updated as appropriate:   She  has no past medical history on file.   She   Patient Active Problem List    Diagnosis Date Noted   • Class 1 obesity due to excess calories with serious comorbidity and body mass index (BMI) of 34.0 to 34.9 in adult 06/24/2023   • Cellulitis of buttock, right 06/24/2023   • Primary hypertension 06/24/2023   • Other hyperlipidemia 06/24/2023     She  has a past surgical history that includes Hysterectomy; Tonsillectomy; and Total knee arthroplasty. Her family history is not on file. She  reports that she has never smoked. She has never been exposed to tobacco smoke. She has never used smokeless tobacco. She reports that she does not currently use alcohol. She reports that she does not currently use drugs. Current Outpatient Medications   Medication Sig Dispense Refill   • aspirin 81 mg chewable tablet Chew 81 mg daily     • hydrochlorothiazide (MICROZIDE) 12.5 mg capsule Take 25 mg by mouth daily     • oxyCODONE (ROXICODONE) 5 immediate release tablet Take 1 tablet (5 mg total) by mouth every 6 (six) hours as needed for moderate pain Max Daily Amount: 20 mg 5 tablet 0   • saccharomyces boulardii (FLORASTOR) 250 mg capsule Take 1 capsule (250 mg total) by mouth 2 (two) times a day for 10 days 20 capsule 0   • sertraline (ZOLOFT) 25 mg tablet Take 150 mg by mouth daily     • simvastatin (ZOCOR) 20 mg tablet Take 20 mg by mouth daily at bedtime       No current facility-administered medications for this visit. She is allergic to sulfa antibiotics. .    Review of Systems   Constitutional: Negative. HENT: Negative. Eyes: Negative for discharge. Respiratory: Negative for shortness of breath. Cardiovascular: Negative for leg swelling. Gastrointestinal: Negative. Skin: Positive for wound. Psychiatric/Behavioral: Negative. Objective:       Wound 06/24/23 Surgical Buttocks Right (Active)   Wound Image Images linked (Simultaneous filing.  User may not have seen previous data.) 07/06/23 0831   Wound Description Yellow;Granulation tissue 07/06/23 0831   Dorina-wound Assessment Erythema 07/06/23 0831   Wound Length (cm) 1.8 cm 07/06/23 0831   Wound Width (cm) 1.8 cm 07/06/23 0831   Wound Depth (cm) 0.5 cm 07/06/23 0831   Wound Surface Area (cm^2) 3.24 cm^2 07/06/23 0831   Wound Volume (cm^3) 1.62 cm^3 07/06/23 0831   Calculated Wound Volume (cm^3) 1.62 cm^3 07/06/23 0831   Drainage Amount Moderate 07/06/23 0831   Drainage Description Serosanguineous 07/06/23 0831   Treatments Cleansed 07/06/23 0831   Wound packed? No 07/06/23 0831   Dressing Changed Changed 07/06/23 0831   Patient Tolerance Tolerated well 07/06/23 0831   Dressing Status Intact 07/06/23 0831       /92   Pulse 100   Temp 97.6 °F (36.4 °C)   Resp 16   Ht 5' 2" (1.575 m)   Wt 78.9 kg (174 lb)   BMI 31.83 kg/m²     Physical Exam  Constitutional:       Appearance: Normal appearance. HENT:      Head: Normocephalic. Eyes:      General:         Right eye: No discharge. Left eye: No discharge. Pulmonary:      Effort: Pulmonary effort is normal.   Abdominal:      Palpations: Abdomen is soft. Musculoskeletal:      Cervical back: Normal range of motion. Skin:     General: Skin is warm and dry. Neurological:      Mental Status: She is alert and oriented to person, place, and time. Psychiatric:         Mood and Affect: Mood normal.         Behavior: Behavior normal.             Wound Instructions:  Orders Placed This Encounter   Procedures   • Wound cleansing and dressings     Wash your hands with soap and water. Remove old dressing, discard into plastic bag and place in trash. Cleanse the wound with SOAP AND WATER prior to applying a clean dressing. Do not use tissue or cotton balls. Do not scrub the wound. Pat dry using gauze. Shower YES  Apply PURACOL AG to the RIGHT BUTTOCK wound. Secure with ALLEVYN WITH GENTLE BORDER  Change dressing 3XWEEK AND PRN AS NEEDED,    RETURN IN 1 WEEK.     CONTINUE VISITING NURSES FOR WOUND CARE     Standing Status:   Future     Standing Expiration Date:   7/6/2024   • Debridement     This order was created via procedure documentation Diagnosis ICD-10-CM Associated Orders   1. Non-healing surgical wound, initial encounter  T81.89XA lidocaine (XYLOCAINE) 4 % topical solution 5 mL     Wound cleansing and dressings     Debridement      2.  Class 1 obesity due to excess calories with serious comorbidity and body mass index (BMI) of 34.0 to 34.9 in adult  E66.09     Z68.34

## 2023-07-13 ENCOUNTER — OFFICE VISIT (OUTPATIENT)
Dept: WOUND CARE | Facility: HOSPITAL | Age: 63
End: 2023-07-13
Payer: COMMERCIAL

## 2023-07-13 VITALS
RESPIRATION RATE: 18 BRPM | SYSTOLIC BLOOD PRESSURE: 120 MMHG | HEART RATE: 72 BPM | DIASTOLIC BLOOD PRESSURE: 68 MMHG | TEMPERATURE: 96.8 F

## 2023-07-13 DIAGNOSIS — T81.89XA NON-HEALING SURGICAL WOUND, INITIAL ENCOUNTER: Primary | ICD-10-CM

## 2023-07-13 PROCEDURE — 97597 DBRDMT OPN WND 1ST 20 CM/<: CPT | Performed by: NURSE PRACTITIONER

## 2023-07-13 RX ORDER — LIDOCAINE HYDROCHLORIDE 40 MG/ML
5 SOLUTION TOPICAL ONCE
Status: COMPLETED | OUTPATIENT
Start: 2023-07-13 | End: 2023-07-13

## 2023-07-13 RX ADMIN — LIDOCAINE HYDROCHLORIDE 5 ML: 40 SOLUTION TOPICAL at 09:47

## 2023-07-13 NOTE — LETTER
[place] [address]  Phone#  [phone]  Fax#  [phone]    Patient:  Natalie Day  YOB: 1960  Phone:  [phone]  Date of Visit:  7/13/2023    Orders Placed This Encounter   Procedures   • Wound cleansing and dressings     Wound cleansing and dressings      Wash your hands with soap and water. Remove old dressing, discard into plastic bag and place in trash. Cleanse the wound with SOAP AND WATER prior to applying a clean dressing. Do not use tissue or cotton balls. Do not scrub the wound. Pat dry using gauze. Shower YES  Apply PURACOL AG to the RIGHT BUTTOCK wound. Secure with ALLEVYN WITH GENTLE BORDER  Change dressing 3XWEEK AND PRN AS NEEDED,     RETURN IN 1 WEEK.      CONTINUE VISITING NURSES FOR WOUND CARE     Standing Status:   Future     Standing Expiration Date:   7/13/2024         Electronically signed by Airam Rodriguez [address] [place] [address]

## 2023-07-13 NOTE — PATIENT INSTRUCTIONS
Orders Placed This Encounter   Procedures    Wound cleansing and dressings     Wound cleansing and dressings      Wash your hands with soap and water. Remove old dressing, discard into plastic bag and place in trash. Cleanse the wound with SOAP AND WATER prior to applying a clean dressing. Do not use tissue or cotton balls. Do not scrub the wound. Pat dry using gauze. Shower YES  Apply PURACOL AG to the RIGHT BUTTOCK wound. Secure with ALLEVYN WITH GENTLE BORDER  Change dressing 3XWEEK AND PRN AS NEEDED,     RETURN IN 1 WEEK.      CONTINUE VISITING NURSES FOR WOUND CARE     Standing Status:   Future     Standing Expiration Date:   7/13/2024

## 2023-07-13 NOTE — PROGRESS NOTES
Patient ID: Jackson Samuels is a 57 y.o. female Date of Birth 1960     Chief Complaint  Chief Complaint   Patient presents with   • Follow Up Wound Care Visit     Wound care       Allergies  Sulfa antibiotics    Assessment:     Diagnoses and all orders for this visit:    Non-healing surgical wound, initial encounter  -     Wound cleansing and dressings; Future  -     lidocaine (XYLOCAINE) 4 % topical solution 5 mL  -     Debridement              Debridement   Wound 06/24/23 Surgical Buttocks Right    Universal Protocol:  Consent: Written consent obtained. Consent given by: patient  Time out: Immediately prior to procedure a "time out" was called to verify the correct patient, procedure, equipment, support staff and site/side marked as required. Timeout called at: 7/13/2023 10:00 AM.  Patient identity confirmed: verbally with patient      Performed by: NP  Debridement type: selective  Pain control: lidocaine 4%  Pre-debridement measurements  Length (cm): 1.4  Width (cm): 1  Depth (cm): 0.1  Surface Area (cm^2): 1.4  Volume (cm^3): 0.14    Post-debridement measurements  Length (cm): 1.4  Width (cm): 1  Depth (cm): 0.1  Percent debrided: 100%  Surface Area (cm^2): 1.4  Area debrided (cm^2): 1.4  Volume (cm^3): 0.14  Devitalized tissue debrided: biofilm, fibrin and slough  Instrument(s) utilized: curette  Bleeding: small  Hemostasis obtained with: pressure  Procedural pain (0-10): 0  Post-procedural pain: 0   Response to treatment: procedure was tolerated well          Plan:  1) Follow up visit for right buttocks partial thickness wound. Wound is measuring smaller with no s/s of infection. Wound debrided. 2) Continue with Puracol Ag covered with Allevyn foam. Change 3x/week. 3) Patient requesting [facility] paperwork. Will fill out. 4) Patient will follow up in 2 weeks. Wound 06/24/23 Surgical Buttocks Right (Active)   Wound Image Images linked 07/13/23 7632   Wound Description Granulation tissue; White 07/13/23 0920   Dorina-wound Assessment Intact 07/13/23 0920   Wound Length (cm) 1.4 cm 07/13/23 0920   Wound Width (cm) 1 cm 07/13/23 0920   Wound Depth (cm) 0.1 cm 07/13/23 0920   Wound Surface Area (cm^2) 1.4 cm^2 07/13/23 0920   Wound Volume (cm^3) 0.14 cm^3 07/13/23 0920   Calculated Wound Volume (cm^3) 0.14 cm^3 07/13/23 0920   Change in Wound Size % 91.36 07/13/23 0920   Drainage Amount Moderate 07/13/23 0920   Drainage Description Serous 07/13/23 0920   Non-staged Wound Description Full thickness 07/13/23 0920   Treatments Cleansed 07/13/23 0920   Wound packed? No 07/13/23 0920   Dressing Changed Changed 07/13/23 0920   Patient Tolerance Tolerated well 07/13/23 0920   Dressing Status Intact 07/13/23 0920       Wound 06/24/23 Surgical Buttocks Right (Active)   Date First Assessed/Time First Assessed: 06/24/23 2030   Present on Original Admission: Yes  Primary Wound Type: (c) Surgical  Location: Buttocks  Wound Location Orientation: Right       Subjective:      . Patient presents for f/u visit of right buttocks wound. Patient has no new complaints. Reports her  has been doing dressing changes. Patient denies increased pain, redness, fevers, chills. The following portions of the patient's history were reviewed and updated as appropriate:   She  has no past medical history on file. She   Patient Active Problem List    Diagnosis Date Noted   • Class 1 obesity due to excess calories with serious comorbidity and body mass index (BMI) of 34.0 to 34.9 in adult 06/24/2023   • Cellulitis of buttock, right 06/24/2023   • Primary hypertension 06/24/2023   • Other hyperlipidemia 06/24/2023     She  has a past surgical history that includes Hysterectomy; Tonsillectomy; and Total knee arthroplasty. Her family history is not on file. She  reports that she has never smoked. She has never been exposed to tobacco smoke. She has never used smokeless tobacco. She reports that she does not currently use alcohol.  She reports that she does not currently use drugs. Current Outpatient Medications   Medication Sig Dispense Refill   • aspirin 81 mg chewable tablet Chew 81 mg daily     • hydrochlorothiazide (MICROZIDE) 12.5 mg capsule Take 25 mg by mouth daily     • oxyCODONE (ROXICODONE) 5 immediate release tablet Take 1 tablet (5 mg total) by mouth every 6 (six) hours as needed for moderate pain Max Daily Amount: 20 mg 5 tablet 0   • sertraline (ZOLOFT) 25 mg tablet Take 150 mg by mouth daily     • simvastatin (ZOCOR) 20 mg tablet Take 20 mg by mouth daily at bedtime       No current facility-administered medications for this visit. She is allergic to sulfa antibiotics. .    Review of Systems   Constitutional: Negative. HENT: Negative for ear pain and hearing loss. Eyes: Negative for pain. Respiratory: Negative for chest tightness and shortness of breath. Cardiovascular: Negative for chest pain, palpitations and leg swelling. Gastrointestinal: Negative for diarrhea, nausea and vomiting. Genitourinary: Negative for dysuria. Musculoskeletal: Negative for gait problem. Skin: Positive for wound. Neurological: Negative for tremors and weakness. Psychiatric/Behavioral: Negative for behavioral problems, confusion and suicidal ideas. Objective:       Wound 06/24/23 Surgical Buttocks Right (Active)   Wound Image Images linked 07/13/23 0920   Wound Description Granulation tissue; White 07/13/23 0920   Dorina-wound Assessment Intact 07/13/23 0920   Wound Length (cm) 1.4 cm 07/13/23 0920   Wound Width (cm) 1 cm 07/13/23 0920   Wound Depth (cm) 0.1 cm 07/13/23 0920   Wound Surface Area (cm^2) 1.4 cm^2 07/13/23 0920   Wound Volume (cm^3) 0.14 cm^3 07/13/23 0920   Calculated Wound Volume (cm^3) 0.14 cm^3 07/13/23 0920   Change in Wound Size % 91.36 07/13/23 0920   Drainage Amount Moderate 07/13/23 0920   Drainage Description Serous 07/13/23 0920   Non-staged Wound Description Full thickness 07/13/23 0920 Treatments Cleansed 07/13/23 0920   Wound packed? No 07/13/23 0920   Dressing Changed Changed 07/13/23 0920   Patient Tolerance Tolerated well 07/13/23 0920   Dressing Status Intact 07/13/23 0920       /68   Pulse 72   Temp (!) 96.8 °F (36 °C)   Resp 18             Wound Instructions:  Orders Placed This Encounter   Procedures   • Wound cleansing and dressings     Wound cleansing and dressings      Wash your hands with soap and water. Remove old dressing, discard into plastic bag and place in trash. Cleanse the wound with SOAP AND WATER prior to applying a clean dressing. Do not use tissue or cotton balls. Do not scrub the wound. Pat dry using gauze. Shower YES  Apply PURACOL AG to the RIGHT BUTTOCK wound. Secure with ALLEVYN WITH GENTLE BORDER  Change dressing 3XWEEK AND PRN AS NEEDED,     RETURN IN 1 WEEK.    CONTINUE VISITING NURSES FOR WOUND CARE     Standing Status:   Future     Standing Expiration Date:   7/13/2024   • Debridement     This order was created via procedure documentation        Diagnosis ICD-10-CM Associated Orders   1.  Non-healing surgical wound, initial encounter  T81.89XA Wound cleansing and dressings     lidocaine (XYLOCAINE) 4 % topical solution 5 mL     Debridement

## 2023-07-27 ENCOUNTER — OFFICE VISIT (OUTPATIENT)
Dept: WOUND CARE | Facility: HOSPITAL | Age: 63
End: 2023-07-27
Payer: COMMERCIAL

## 2023-07-27 VITALS
DIASTOLIC BLOOD PRESSURE: 74 MMHG | HEART RATE: 98 BPM | RESPIRATION RATE: 18 BRPM | SYSTOLIC BLOOD PRESSURE: 118 MMHG | TEMPERATURE: 99 F

## 2023-07-27 DIAGNOSIS — T81.89XA NON-HEALING SURGICAL WOUND, INITIAL ENCOUNTER: Primary | ICD-10-CM

## 2023-07-27 PROCEDURE — 97597 DBRDMT OPN WND 1ST 20 CM/<: CPT | Performed by: NURSE PRACTITIONER

## 2023-07-27 RX ORDER — LIDOCAINE 40 MG/G
CREAM TOPICAL ONCE
Status: COMPLETED | OUTPATIENT
Start: 2023-07-27 | End: 2023-07-27

## 2023-07-27 RX ADMIN — LIDOCAINE: 40 CREAM TOPICAL at 09:44

## 2023-07-27 NOTE — PROGRESS NOTES
Patient ID: Janell Hammer is a 57 y.o. female Date of Birth 1960     Chief Complaint  Chief Complaint   Patient presents with   • Follow Up Wound Care Visit     Wound care       Allergies  Sulfa antibiotics    Assessment:    No problem-specific Assessment & Plan notes found for this encounter. Diagnoses and all orders for this visit:    Non-healing surgical wound, initial encounter  -     lidocaine (LMX) 4 % cream  -     Wound cleansing and dressings; Future  -     Debridement              Debridement   Wound 06/24/23 Surgical Buttocks Right    Universal Protocol:  Consent: Written consent obtained. Consent given by: patient  Time out: Immediately prior to procedure a "time out" was called to verify the correct patient, procedure, equipment, support staff and site/side marked as required. Timeout called at: 7/27/2023 9:45 AM.  Patient identity confirmed: verbally with patient      Performed by: NP  Debridement type: selective  Pain control: lidocaine 4%  Pre-debridement measurements  Length (cm): 0.9  Width (cm): 0.9  Depth (cm): 0.1  Surface Area (cm^2): 0.81  Volume (cm^3): 0.08    Post-debridement measurements  Length (cm): 0.9  Width (cm): 0.9  Depth (cm): 0.1  Percent debrided: 100%  Surface Area (cm^2): 0.81  Area debrided (cm^2): 0.81  Volume (cm^3): 0.08  Devitalized tissue debrided: biofilm, fibrin and slough  Instrument(s) utilized: curette  Bleeding: small  Hemostasis obtained with: pressure  Procedural pain (0-10): 0  Post-procedural pain: 0   Response to treatment: procedure was tolerated well          Plan:  1) F/u visit for right buttock wound. Wound debrided. Wound is measuring smaller with granulation tissue present and no s/s of infection. Continue with current treatment. Change 3x/week. 2) Patient will follow up in 2 weeks. Wound 06/24/23 Surgical Buttocks Right (Active)   Wound Image Images linked 07/27/23 2923   Wound Description Granulation tissue; Epithelialization;Slough 07/27/23 0930   Wound Length (cm) 0.9 cm 07/27/23 0930   Wound Width (cm) 0.9 cm 07/27/23 0930   Wound Depth (cm) 0.1 cm 07/27/23 0930   Wound Surface Area (cm^2) 0.81 cm^2 07/27/23 0930   Wound Volume (cm^3) 0.081 cm^3 07/27/23 0930   Calculated Wound Volume (cm^3) 0.08 cm^3 07/27/23 0930   Change in Wound Size % 95.06 07/27/23 0930   Drainage Amount Moderate 07/27/23 0930   Drainage Description Serosanguineous 07/27/23 0930   Non-staged Wound Description Full thickness 07/27/23 0930   Treatments Cleansed 07/27/23 0930   Wound packed? No 07/27/23 0930   Dressing Changed Changed 07/27/23 0930   Patient Tolerance Tolerated well 07/27/23 0930       Wound 06/24/23 Surgical Buttocks Right (Active)   Date First Assessed/Time First Assessed: 06/24/23 2030   Present on Original Admission: Yes  Primary Wound Type: (c) Surgical  Location: Buttocks  Wound Location Orientation: Right       Subjective:      . Patient presents for f/u visit of right buttock surgical wound. Patient has no new complaints. She denies wound pain, increased drainage, fevers, chills. The following portions of the patient's history were reviewed and updated as appropriate:   She  has no past medical history on file. She   Patient Active Problem List    Diagnosis Date Noted   • Class 1 obesity due to excess calories with serious comorbidity and body mass index (BMI) of 34.0 to 34.9 in adult 06/24/2023   • Cellulitis of buttock, right 06/24/2023   • Primary hypertension 06/24/2023   • Other hyperlipidemia 06/24/2023     She  has a past surgical history that includes Hysterectomy; Tonsillectomy; and Total knee arthroplasty. Her family history is not on file. She  reports that she has never smoked. She has never been exposed to tobacco smoke. She has never used smokeless tobacco. She reports that she does not currently use alcohol. She reports that she does not currently use drugs.   Current Outpatient Medications   Medication Sig Dispense Refill   • aspirin 81 mg chewable tablet Chew 81 mg daily     • hydrochlorothiazide (MICROZIDE) 12.5 mg capsule Take 25 mg by mouth daily     • oxyCODONE (ROXICODONE) 5 immediate release tablet Take 1 tablet (5 mg total) by mouth every 6 (six) hours as needed for moderate pain Max Daily Amount: 20 mg 5 tablet 0   • sertraline (ZOLOFT) 25 mg tablet Take 150 mg by mouth daily     • simvastatin (ZOCOR) 20 mg tablet Take 20 mg by mouth daily at bedtime       No current facility-administered medications for this visit. She is allergic to sulfa antibiotics. .    Review of Systems   Constitutional: Negative. HENT: Negative for ear pain and hearing loss. Eyes: Negative for pain. Respiratory: Negative for chest tightness and shortness of breath. Cardiovascular: Negative for chest pain, palpitations and leg swelling. Gastrointestinal: Negative for diarrhea, nausea and vomiting. Genitourinary: Negative for dysuria. Musculoskeletal: Negative for gait problem. Skin: Positive for wound. Neurological: Negative for tremors and weakness. Psychiatric/Behavioral: Negative for behavioral problems, confusion and suicidal ideas. Objective:       Wound 06/24/23 Surgical Buttocks Right (Active)   Wound Image Images linked 07/27/23 0931   Wound Description Granulation tissue; Epithelialization;Slough 07/27/23 0930   Wound Length (cm) 0.9 cm 07/27/23 0930   Wound Width (cm) 0.9 cm 07/27/23 0930   Wound Depth (cm) 0.1 cm 07/27/23 0930   Wound Surface Area (cm^2) 0.81 cm^2 07/27/23 0930   Wound Volume (cm^3) 0.081 cm^3 07/27/23 0930   Calculated Wound Volume (cm^3) 0.08 cm^3 07/27/23 0930   Change in Wound Size % 95.06 07/27/23 0930   Drainage Amount Moderate 07/27/23 0930   Drainage Description Serosanguineous 07/27/23 0930   Non-staged Wound Description Full thickness 07/27/23 0930   Treatments Cleansed 07/27/23 0930   Wound packed?  No 07/27/23 0930   Dressing Changed Changed 07/27/23 0930   Patient Tolerance Tolerated well 07/27/23 0930       /74   Pulse 98   Temp 99 °F (37.2 °C)   Resp 18             Wound Instructions:  Orders Placed This Encounter   Procedures   • Wound cleansing and dressings         Wound cleansing and dressings                            Wash your hands with soap and water. Remove old dressing, discard into plastic bag and place in trash. Cleanse the wound with SOAP AND WATER prior to applying a clean dressing. Do not use tissue or cotton balls. Do not scrub the wound. Pat dry using gauze. Shower YES  Apply PURACOL AG to the RIGHT BUTTOCK wound. Secure with ALLEVYN WITH GENTLE BORDER  Change dressing 3XWEEK AND PRN AS NEEDED,     RETURN IN 2  WEEK. Done today        Standing Status:   Future     Standing Expiration Date:   7/27/2024   • Debridement     This order was created via procedure documentation        Diagnosis ICD-10-CM Associated Orders   1.  Non-healing surgical wound, initial encounter  T81.89XA lidocaine (LMX) 4 % cream     Wound cleansing and dressings     Debridement

## 2023-07-27 NOTE — PATIENT INSTRUCTIONS
Orders Placed This Encounter   Procedures    Wound cleansing and dressings         Wound cleansing and dressings                            Wash your hands with soap and water. Remove old dressing, discard into plastic bag and place in trash. Cleanse the wound with SOAP AND WATER prior to applying a clean dressing. Do not use tissue or cotton balls. Do not scrub the wound. Pat dry using gauze. Shower YES  Apply PURACOL AG to the RIGHT BUTTOCK wound. Secure with ALLEVYN WITH GENTLE BORDER  Change dressing 3XWEEK AND PRN AS NEEDED,     RETURN IN 2  WEEK.   Done today        Standing Status:   Future     Standing Expiration Date:   7/27/2024

## 2023-08-10 ENCOUNTER — OFFICE VISIT (OUTPATIENT)
Dept: WOUND CARE | Facility: HOSPITAL | Age: 63
End: 2023-08-10
Payer: COMMERCIAL

## 2023-08-10 VITALS
DIASTOLIC BLOOD PRESSURE: 68 MMHG | SYSTOLIC BLOOD PRESSURE: 110 MMHG | RESPIRATION RATE: 18 BRPM | TEMPERATURE: 96.2 F | HEART RATE: 72 BPM

## 2023-08-10 DIAGNOSIS — E66.09 CLASS 1 OBESITY DUE TO EXCESS CALORIES WITH SERIOUS COMORBIDITY AND BODY MASS INDEX (BMI) OF 34.0 TO 34.9 IN ADULT: ICD-10-CM

## 2023-08-10 DIAGNOSIS — T81.89XA NON-HEALING SURGICAL WOUND, INITIAL ENCOUNTER: Primary | ICD-10-CM

## 2023-08-10 PROCEDURE — 99213 OFFICE O/P EST LOW 20 MIN: CPT | Performed by: NURSE PRACTITIONER

## 2023-08-10 RX ORDER — LIDOCAINE HYDROCHLORIDE 40 MG/ML
5 SOLUTION TOPICAL ONCE
Status: COMPLETED | OUTPATIENT
Start: 2023-08-10 | End: 2023-08-10

## 2023-08-10 RX ADMIN — LIDOCAINE HYDROCHLORIDE 5 ML: 40 SOLUTION TOPICAL at 08:53

## 2023-08-10 NOTE — PATIENT INSTRUCTIONS
Orders Placed This Encounter   Procedures    Wound cleansing and dressings                  Wound cleansing and dressings                            Wash your hands with soap and water. Remove old dressing, discard into plastic bag and place in trash. Cleanse the wound with SOAP AND WATER prior to applying a clean dressing. Do not use tissue or cotton balls. Do not scrub the wound. Pat dry using gauze. Shower YES  Apply PURACOL AG to the RIGHT BUTTOCK wound. Secure with ALLEVYN WITH GENTLE BORDER  Change dressing 3XWEEK AND PRN AS NEEDED,     RETURN IN 2  WEEK.   Done today     Standing Status:   Future     Standing Expiration Date:   8/10/2024

## 2023-08-10 NOTE — PROGRESS NOTES
Patient ID: Mimi Rosa is a 57 y.o. female Date of Birth 1960     Chief Complaint  Chief Complaint   Patient presents with   • Follow Up Wound Care Visit     Wound care       Allergies  Sulfa antibiotics    Assessment:    No problem-specific Assessment & Plan notes found for this encounter. Diagnoses and all orders for this visit:    Non-healing surgical wound, initial encounter  -     lidocaine (XYLOCAINE) 4 % topical solution 5 mL  -     Wound cleansing and dressings; Future    Class 1 obesity due to excess calories with serious comorbidity and body mass index (BMI) of 34.0 to 34.9 in adult          Plan:  1. F/u visit. Wound mechanically debrided today. Wound improving and measuring smaller. Continue current plan of care. Patient will follow up in 2 weeks. Wound 06/24/23 Surgical Buttocks Right (Active)   Wound Image Images linked 08/10/23 0844   Wound Description Granulation tissue;Pink 08/10/23 0843   Dorina-wound Assessment Intact; Other (Comment) (small abrasion area noted not included in measurement will be covered by dressing.) 08/10/23 0843   Wound Length (cm) 0.4 cm 08/10/23 0843   Wound Width (cm) 0.4 cm 08/10/23 0843   Wound Depth (cm) 0.1 cm 08/10/23 0843   Wound Surface Area (cm^2) 0.16 cm^2 08/10/23 0843   Wound Volume (cm^3) 0.016 cm^3 08/10/23 0843   Calculated Wound Volume (cm^3) 0.02 cm^3 08/10/23 0843   Change in Wound Size % 98.77 08/10/23 0843   Drainage Amount Moderate 08/10/23 0843   Drainage Description Serosanguineous; Serous 08/10/23 0843   Non-staged Wound Description Full thickness 08/10/23 0843   Treatments Cleansed 08/10/23 0843   Wound packed?  No 08/10/23 0843   Dressing Changed Changed 08/10/23 0843   Patient Tolerance Tolerated well 08/10/23 0843   Dressing Status Intact 08/10/23 0843       Wound 06/24/23 Surgical Buttocks Right (Active)   Date First Assessed/Time First Assessed: 06/24/23 2030   Present on Original Admission: Yes  Primary Wound Type: (c) Surgical Location: Buttocks  Wound Location Orientation: Right       Subjective:      . F/u visit non-healing surgical wound of right buttocks. No new complaints. She denies any pain, fevers, or chills. The following portions of the patient's history were reviewed and updated as appropriate:   She  has no past medical history on file. She   Patient Active Problem List    Diagnosis Date Noted   • Class 1 obesity due to excess calories with serious comorbidity and body mass index (BMI) of 34.0 to 34.9 in adult 06/24/2023   • Cellulitis of buttock, right 06/24/2023   • Primary hypertension 06/24/2023   • Other hyperlipidemia 06/24/2023     She  has a past surgical history that includes Hysterectomy; Tonsillectomy; and Total knee arthroplasty. Her family history is not on file. She  reports that she has never smoked. She has never been exposed to tobacco smoke. She has never used smokeless tobacco. She reports that she does not currently use alcohol. She reports that she does not currently use drugs. Current Outpatient Medications   Medication Sig Dispense Refill   • aspirin 81 mg chewable tablet Chew 81 mg daily     • hydrochlorothiazide (MICROZIDE) 12.5 mg capsule Take 25 mg by mouth daily     • oxyCODONE (ROXICODONE) 5 immediate release tablet Take 1 tablet (5 mg total) by mouth every 6 (six) hours as needed for moderate pain Max Daily Amount: 20 mg 5 tablet 0   • sertraline (ZOLOFT) 25 mg tablet Take 150 mg by mouth daily     • simvastatin (ZOCOR) 20 mg tablet Take 20 mg by mouth daily at bedtime       No current facility-administered medications for this visit. She is allergic to sulfa antibiotics. .    Review of Systems   Constitutional: Negative. HENT: Negative for ear pain and hearing loss. Eyes: Negative for pain. Respiratory: Negative for chest tightness and shortness of breath. Cardiovascular: Negative for chest pain, palpitations and leg swelling.    Gastrointestinal: Negative for diarrhea, nausea and vomiting. Genitourinary: Negative for dysuria. Musculoskeletal: Negative for gait problem. Skin: Positive for wound. Neurological: Negative for tremors and weakness. Psychiatric/Behavioral: Negative for behavioral problems, confusion and suicidal ideas. Objective:       Wound 06/24/23 Surgical Buttocks Right (Active)   Wound Image Images linked 08/10/23 0844   Wound Description Granulation tissue;Pink 08/10/23 0843   Dorina-wound Assessment Intact; Other (Comment) (small abrasion area noted not included in measurement will be covered by dressing.) 08/10/23 0843   Wound Length (cm) 0.4 cm 08/10/23 0843   Wound Width (cm) 0.4 cm 08/10/23 0843   Wound Depth (cm) 0.1 cm 08/10/23 0843   Wound Surface Area (cm^2) 0.16 cm^2 08/10/23 0843   Wound Volume (cm^3) 0.016 cm^3 08/10/23 0843   Calculated Wound Volume (cm^3) 0.02 cm^3 08/10/23 0843   Change in Wound Size % 98.77 08/10/23 0843   Drainage Amount Moderate 08/10/23 0843   Drainage Description Serosanguineous; Serous 08/10/23 0843   Non-staged Wound Description Full thickness 08/10/23 0843   Treatments Cleansed 08/10/23 0843   Wound packed? No 08/10/23 0843   Dressing Changed Changed 08/10/23 0843   Patient Tolerance Tolerated well 08/10/23 0843   Dressing Status Intact 08/10/23 0843       /68   Pulse 72   Temp (!) 96.2 °F (35.7 °C)   Resp 18     Physical Exam  Vitals and nursing note reviewed. Constitutional:       General: She is not in acute distress. Appearance: Normal appearance. She is normal weight. HENT:      Head: Normocephalic and atraumatic. Eyes:      General:         Right eye: No discharge. Left eye: No discharge. Pulmonary:      Effort: Pulmonary effort is normal. No respiratory distress. Musculoskeletal:         General: Normal range of motion. Cervical back: Normal range of motion and neck supple. No rigidity. Right lower leg: No edema. Left lower leg: No edema.    Skin:     General: Skin is warm and dry. Findings: Wound present. No erythema. Neurological:      General: No focal deficit present. Mental Status: She is alert and oriented to person, place, and time. Mental status is at baseline. Psychiatric:         Mood and Affect: Mood normal.         Behavior: Behavior normal.         Thought Content: Thought content normal.         Judgment: Judgment normal.                   Wound Instructions:  Orders Placed This Encounter   Procedures   • Wound cleansing and dressings                  Wound cleansing and dressings                            Wash your hands with soap and water. Remove old dressing, discard into plastic bag and place in trash. Cleanse the wound with SOAP AND WATER prior to applying a clean dressing. Do not use tissue or cotton balls. Do not scrub the wound. Pat dry using gauze. Shower YES  Apply PURACOL AG to the RIGHT BUTTOCK wound. Secure with ALLEVYN WITH GENTLE BORDER  Change dressing 3XWEEK AND PRN AS NEEDED,     RETURN IN 2  WEEK. Done today     Standing Status:   Future     Standing Expiration Date:   8/10/2024        Diagnosis ICD-10-CM Associated Orders   1. Non-healing surgical wound, initial encounter  T81.89XA lidocaine (XYLOCAINE) 4 % topical solution 5 mL     Wound cleansing and dressings      2.  Class 1 obesity due to excess calories with serious comorbidity and body mass index (BMI) of 34.0 to 34.9 in adult  E66.09     Z68.34

## 2023-08-24 ENCOUNTER — OFFICE VISIT (OUTPATIENT)
Dept: WOUND CARE | Facility: HOSPITAL | Age: 63
End: 2023-08-24
Payer: COMMERCIAL

## 2023-08-24 VITALS
DIASTOLIC BLOOD PRESSURE: 70 MMHG | TEMPERATURE: 97.4 F | HEART RATE: 72 BPM | RESPIRATION RATE: 16 BRPM | SYSTOLIC BLOOD PRESSURE: 104 MMHG

## 2023-08-24 DIAGNOSIS — T81.89XA NON-HEALING SURGICAL WOUND, INITIAL ENCOUNTER: Primary | ICD-10-CM

## 2023-08-24 PROCEDURE — 99212 OFFICE O/P EST SF 10 MIN: CPT | Performed by: NURSE PRACTITIONER

## 2023-08-24 NOTE — LETTER
August 24, 2023     Patient: Amber Mcgowan  YOB: 1960  Date of Visit: 8/24/2023      To Whom it May Concern:    Amber Mcgowan is under my professional care. Olman Kaur was seen in my office on 8/24/2023. Olman Kaur may return to work on 8/25/2023  with no restrictions. If you have any questions or concerns, please don't hesitate to call.          Sincerely,          CAYDEN Major        CC: No Recipients

## 2023-08-24 NOTE — PROGRESS NOTES
06/25/17 1200   Group 1   Start Time 0930   Stop Time 1015   Length (min) 45 Min   Group Name Check in   Focus of Group Symptoms/goals   Attendance Present   Mood Bright   Affect Relaxed;Positive   Behavior/Socialization Cooperative;Engaged   Thought Process Focused   Patient Response Attentive;Interactive   Task Performance Follows directions   Degree Patient Ready for Discharge No   Group Notes Pt. reporting less tremors, continue to have hot and cold sweats. Set goal to feel better mentally and physically   Group 2   Start Time 1030   Stop Time 1115   Length (min) 45 min   Group Name Education   Focus of Group Go ask anyone   Attendance Present   Mood Bright   Affect Relaxed;Positive   Behavior/Socialization Cooperative   Thought Process Focused   Patient Response Attentive;Interactive   Task Performance Follows directions   Group Notes Pt. engaged in group discussin and social with peers      Patient ID: Ranjith Florez is a 57 y.o. female Date of Birth 1960     Chief Complaint  Chief Complaint   Patient presents with   • Follow Up Wound Care Visit     R Buttock wound        Allergies  Sulfa antibiotics    Assessment:     Diagnoses and all orders for this visit:    Non-healing surgical wound, initial encounter  -     Wound cleansing and dressings; Future          Plan:  1. F/u visit. Wound epithelialized. Patient is discharged from the wound center today. Patient is cleared to return to work with no restrictions. She can follow up PRN     Wound 06/24/23 Surgical Buttocks Right (Active)   Wound Image Images linked 08/24/23 0858   Wound Description Epithelialization 08/24/23 0857   Dorina-wound Assessment Intact 08/24/23 0857   Wound Length (cm) 0 cm 08/24/23 0857   Wound Width (cm) 0 cm 08/24/23 0857   Wound Depth (cm) 0 cm 08/24/23 0857   Wound Surface Area (cm^2) 0 cm^2 08/24/23 0857   Wound Volume (cm^3) 0 cm^3 08/24/23 0857   Calculated Wound Volume (cm^3) 0 cm^3 08/24/23 0857   Change in Wound Size % 100 08/24/23 0857   Drainage Amount None 08/24/23 0857   Non-staged Wound Description Not applicable 37/02/24 9123   Dressing Status Intact 08/24/23 0857       Wound 06/24/23 Surgical Buttocks Right (Active)   Date First Assessed/Time First Assessed: 06/24/23 2030   Present on Original Admission: Yes  Primary Wound Type: (c) Surgical  Location: Buttocks  Wound Location Orientation: Right  Wound Outcome: Healed       Subjective:      . F/u visit non-healing surgical wound of right buttock. No new complaints. She denies any pain, fevers, or chills. The following portions of the patient's history were reviewed and updated as appropriate:   She  has no past medical history on file.   She   Patient Active Problem List    Diagnosis Date Noted   • Class 1 obesity due to excess calories with serious comorbidity and body mass index (BMI) of 34.0 to 34.9 in adult 06/24/2023   • Cellulitis of buttock, right 06/24/2023   • Primary hypertension 06/24/2023   • Other hyperlipidemia 06/24/2023     She  has a past surgical history that includes Hysterectomy; Tonsillectomy; and Total knee arthroplasty. Her family history is not on file. She  reports that she has never smoked. She has never been exposed to tobacco smoke. She has never used smokeless tobacco. She reports that she does not currently use alcohol. She reports that she does not currently use drugs. Current Outpatient Medications   Medication Sig Dispense Refill   • aspirin 81 mg chewable tablet Chew 81 mg daily     • hydrochlorothiazide (MICROZIDE) 12.5 mg capsule Take 25 mg by mouth daily     • oxyCODONE (ROXICODONE) 5 immediate release tablet Take 1 tablet (5 mg total) by mouth every 6 (six) hours as needed for moderate pain Max Daily Amount: 20 mg 5 tablet 0   • sertraline (ZOLOFT) 25 mg tablet Take 150 mg by mouth daily     • simvastatin (ZOCOR) 20 mg tablet Take 20 mg by mouth daily at bedtime       No current facility-administered medications for this visit. She is allergic to sulfa antibiotics. .    Review of Systems   Constitutional: Negative. HENT: Negative for ear pain and hearing loss. Eyes: Negative for pain. Respiratory: Negative for chest tightness and shortness of breath. Cardiovascular: Negative for chest pain, palpitations and leg swelling. Gastrointestinal: Negative for diarrhea, nausea and vomiting. Genitourinary: Negative for dysuria. Musculoskeletal: Negative for gait problem. Skin: Positive for wound. Neurological: Negative for tremors and weakness. Psychiatric/Behavioral: Negative for behavioral problems, confusion and suicidal ideas.          Objective:       Wound 06/24/23 Surgical Buttocks Right (Active)   Wound Image Images linked 08/24/23 0858   Wound Description Epithelialization 08/24/23 0857   Dorina-wound Assessment Intact 08/24/23 0857   Wound Length (cm) 0 cm 08/24/23 0857   Wound Width (cm) 0 cm 08/24/23 8442   Wound Depth (cm) 0 cm 08/24/23 0857   Wound Surface Area (cm^2) 0 cm^2 08/24/23 0857   Wound Volume (cm^3) 0 cm^3 08/24/23 0857   Calculated Wound Volume (cm^3) 0 cm^3 08/24/23 0857   Change in Wound Size % 100 08/24/23 0857   Drainage Amount None 08/24/23 0857   Non-staged Wound Description Not applicable 76/27/49 9061   Dressing Status Intact 08/24/23 0857       /70   Pulse 72   Temp (!) 97.4 °F (36.3 °C)   Resp 16     Physical Exam  Vitals and nursing note reviewed. Constitutional:       General: She is not in acute distress. Appearance: Normal appearance. She is normal weight. HENT:      Head: Normocephalic and atraumatic. Eyes:      General:         Right eye: No discharge. Left eye: No discharge. Pulmonary:      Effort: Pulmonary effort is normal. No respiratory distress. Musculoskeletal:         General: Normal range of motion. Cervical back: Normal range of motion and neck supple. No rigidity. Right lower leg: No edema. Left lower leg: No edema. Skin:     General: Skin is warm and dry. Findings: No erythema or wound. Neurological:      General: No focal deficit present. Mental Status: She is alert and oriented to person, place, and time. Mental status is at baseline. Psychiatric:         Mood and Affect: Mood normal.         Behavior: Behavior normal.         Thought Content: Thought content normal.         Judgment: Judgment normal.                    Wound Instructions:  Orders Placed This Encounter   Procedures   • Wound cleansing and dressings     Wound is healed at appt. today. Patient discharged from the [facility] today. Standing Status:   Future     Standing Expiration Date:   8/24/2024        Diagnosis ICD-10-CM Associated Orders   1.  Non-healing surgical wound, initial encounter  T81.89XA Wound cleansing and dressings

## 2023-08-24 NOTE — PATIENT INSTRUCTIONS
Orders Placed This Encounter   Procedures    Wound cleansing and dressings     Wound is healed at appt. today. Patient discharged from the [facility] today.      Standing Status:   Future     Standing Expiration Date:   8/24/2024